# Patient Record
Sex: MALE | Race: WHITE | Employment: OTHER | URBAN - METROPOLITAN AREA
[De-identification: names, ages, dates, MRNs, and addresses within clinical notes are randomized per-mention and may not be internally consistent; named-entity substitution may affect disease eponyms.]

---

## 2017-05-02 RX ORDER — CLONAZEPAM 1 MG/1
TABLET ORAL
Qty: 30 TABLET | Refills: 5 | Status: SHIPPED
Start: 2017-05-02 | End: 2017-11-20

## 2017-05-02 RX ORDER — ALBUTEROL SULFATE 90 UG/1
1 AEROSOL, METERED RESPIRATORY (INHALATION) AS DIRECTED
COMMUNITY
Start: 2015-11-07 | End: 2017-11-17

## 2017-05-02 RX ORDER — CLONAZEPAM 1 MG/1
1 TABLET ORAL
Refills: 5 | COMMUNITY
Start: 2017-04-10 | End: 2017-05-02

## 2017-05-02 RX ORDER — AMLODIPINE BESYLATE 5 MG/1
1 TABLET ORAL DAILY
Refills: 3 | COMMUNITY
Start: 2017-04-10 | End: 2017-07-26

## 2017-05-02 RX ORDER — OMEPRAZOLE 20 MG/1
1 CAPSULE, DELAYED RELEASE ORAL DAILY
Refills: 3 | COMMUNITY
Start: 2017-04-18 | End: 2017-09-27

## 2017-05-02 RX ORDER — ATORVASTATIN CALCIUM 10 MG/1
1 TABLET, FILM COATED ORAL
Refills: 3 | COMMUNITY
Start: 2017-04-10 | End: 2017-07-26 | Stop reason: DRUGHIGH

## 2017-05-02 RX ORDER — TAMSULOSIN HYDROCHLORIDE 0.4 MG/1
1 CAPSULE ORAL DAILY
COMMUNITY
Start: 2016-08-25 | End: 2017-05-16

## 2017-05-02 RX ORDER — RAMIPRIL 10 MG/1
1 CAPSULE ORAL DAILY
Refills: 3 | COMMUNITY
Start: 2017-04-26 | End: 2017-07-26

## 2017-05-10 ENCOUNTER — TELEPHONE (OUTPATIENT)
Dept: FAMILY MEDICINE CLINIC | Facility: CLINIC | Age: 82
End: 2017-05-10

## 2017-05-10 DIAGNOSIS — I10 ESSENTIAL HYPERTENSION: Primary | ICD-10-CM

## 2017-05-10 NOTE — TELEPHONE ENCOUNTER
----- Message from Mariola Rosas sent at 5/10/2017  8:22 AM CDT -----  Regarding: lab orders needed   Patient has lab appointment on 5/12/17  could you please put lab orders in system.         Thanks,  Kavita

## 2017-05-12 ENCOUNTER — LAB ENCOUNTER (OUTPATIENT)
Dept: LAB | Age: 82
End: 2017-05-12
Attending: FAMILY MEDICINE
Payer: MEDICARE

## 2017-05-12 DIAGNOSIS — I10 ESSENTIAL HYPERTENSION: ICD-10-CM

## 2017-05-12 PROCEDURE — 85025 COMPLETE CBC W/AUTO DIFF WBC: CPT

## 2017-05-12 PROCEDURE — 80053 COMPREHEN METABOLIC PANEL: CPT

## 2017-05-16 ENCOUNTER — OFFICE VISIT (OUTPATIENT)
Dept: FAMILY MEDICINE CLINIC | Facility: CLINIC | Age: 82
End: 2017-05-16

## 2017-05-16 VITALS
HEIGHT: 71 IN | RESPIRATION RATE: 16 BRPM | HEART RATE: 80 BPM | BODY MASS INDEX: 23.96 KG/M2 | DIASTOLIC BLOOD PRESSURE: 80 MMHG | SYSTOLIC BLOOD PRESSURE: 176 MMHG | TEMPERATURE: 98 F | WEIGHT: 171.13 LBS

## 2017-05-16 DIAGNOSIS — L30.8 OTHER ECZEMA: ICD-10-CM

## 2017-05-16 DIAGNOSIS — C44.91 BASAL CELL CARCINOMA: ICD-10-CM

## 2017-05-16 DIAGNOSIS — I10 BENIGN ESSENTIAL HYPERTENSION: ICD-10-CM

## 2017-05-16 DIAGNOSIS — I65.23 BILATERAL CAROTID ARTERY STENOSIS: Primary | ICD-10-CM

## 2017-05-16 DIAGNOSIS — J44.9 CHRONIC OBSTRUCTIVE PULMONARY DISEASE, UNSPECIFIED COPD TYPE (HCC): ICD-10-CM

## 2017-05-16 PROBLEM — I65.29 STENOSIS OF CAROTID ARTERY: Status: ACTIVE | Noted: 2017-05-16

## 2017-05-16 PROBLEM — D86.9 SARCOIDOSIS: Status: ACTIVE | Noted: 2017-05-16

## 2017-05-16 PROBLEM — I70.92 CHRONIC TOTAL OCCLUSION OF ARTERY OF EXTREMITY (HCC): Status: ACTIVE | Noted: 2017-05-16

## 2017-05-16 PROCEDURE — 99214 OFFICE O/P EST MOD 30 MIN: CPT | Performed by: FAMILY MEDICINE

## 2017-05-16 PROCEDURE — 93000 ELECTROCARDIOGRAM COMPLETE: CPT | Performed by: FAMILY MEDICINE

## 2017-05-16 RX ORDER — ASPIRIN 81 MG/1
1 TABLET ORAL DAILY
COMMUNITY
End: 2018-05-29 | Stop reason: DRUGHIGH

## 2017-05-16 NOTE — PATIENT INSTRUCTIONS
If the feeling of dread comes back, try going outside. Use a moisturizing salve on the rash on the back. Schedule carotid doppler testing. See Dr Nisha Madrid for ear.

## 2017-05-16 NOTE — PROGRESS NOTES
[facility] [place]  PROGRESS NOTE  Chief Complaint:   Patient presents with:  Medication Follow-Up  Lump: Back      HPI:   This is a 80year old male coming in for medication follow-up. He has several concerns today.     First of all he said rich 1. 30-6.70 x10 (3) uL   Neutrophil Absolute 3.04 1.30-6.70 x10(3) uL   Lymphocyte Absolute 1.25 0.90-4.00 x10(3) uL   Monocyte Absolute 0.53 0.10-0.60 x10(3) uL   Eosinophil Absolute 0.30 0.00-0.30 x10(3) uL   Basophil Absolute 0.02 0.00-0.10 x10(3) uL   Im Comment:Other reaction(s): swollen tongue  Trimethoprim            Hives  Current Meds:    Current Outpatient Prescriptions:  aspirin  MG Oral Tab EC Take 1 tablet by mouth daily.  Disp:  Rfl:    Pumpkin Seed-Soy Germ (AZO BLADDER CONTROL/GO-LESS OR) bruising. LYMPHATICS:  Denies enlarged nodes or history of splenectomy. PSYCHIATRIC: He denies feeling depressed or anxious but did have a feeling of impending doom several days ago.   ENDOCRINOLOGIC:  Denies excessive sweating, cold or heat intolerance, carotid artery stenosis  He does have a history of bilateral carotid artery stenosis. He is due for a screening carotid Doppler. We will order that for him now. - US CAROTID DOPPLER BILAT - DIAG IM (CPT=93880); Future    2.  Benign essential hypertensio allergies, or worsening or changing symptoms. Patient is to call with any side effects or complications from the treatments as a result of today.      Problem List:  Patient Active Problem List:     Malignant neoplasm of colon ([facility] [address]     Stenosis of carotid

## 2017-06-07 ENCOUNTER — TELEPHONE (OUTPATIENT)
Dept: FAMILY MEDICINE CLINIC | Facility: CLINIC | Age: 82
End: 2017-06-07

## 2017-06-08 NOTE — TELEPHONE ENCOUNTER
I called Gene. His carotid ultrasound shows complete blockage on the left side and good blood flow on the right side. It is unchanged from June 2015. No new treatment needed now.

## 2017-06-14 ENCOUNTER — PATIENT OUTREACH (OUTPATIENT)
Dept: FAMILY MEDICINE CLINIC | Facility: CLINIC | Age: 82
End: 2017-06-14

## 2017-06-14 ENCOUNTER — TELEPHONE (OUTPATIENT)
Dept: FAMILY MEDICINE CLINIC | Facility: CLINIC | Age: 82
End: 2017-06-14

## 2017-06-14 NOTE — TELEPHONE ENCOUNTER
Patient informed His visit in November has been changed to a Medicare Physical.  Informed He should not receive anymore phone calls regarding Appt needed/agreed.   Leeanna Tejdaa, 06/14/2017, 5:05 PM

## 2017-07-08 ENCOUNTER — TELEPHONE (OUTPATIENT)
Dept: FAMILY MEDICINE CLINIC | Facility: CLINIC | Age: 82
End: 2017-07-08

## 2017-07-08 NOTE — TELEPHONE ENCOUNTER
Called to find out how pt is doing, if pt did have a TIA this morning. This office visit was not triaged just scheduled. Left message for a return call to see how pt is doing. Per Dr. Clotilde Lyles if pt did have a TIA this morning pt should go to ER.

## 2017-07-08 NOTE — TELEPHONE ENCOUNTER
Pt daughter returned my call. According to [facility], p'st leg are weaker today, right is worse. Also pt memory loss is worse today, pt can't remember yesterday at all. No facial drooping or slurring of words.  Due to the memory loss and leg weakness, pt was sen

## 2017-07-14 ENCOUNTER — MED REC SCAN ONLY (OUTPATIENT)
Dept: FAMILY MEDICINE CLINIC | Facility: CLINIC | Age: 82
End: 2017-07-14

## 2017-07-26 ENCOUNTER — OFFICE VISIT (OUTPATIENT)
Dept: FAMILY MEDICINE CLINIC | Facility: CLINIC | Age: 82
End: 2017-07-26

## 2017-07-26 VITALS
HEART RATE: 72 BPM | BODY MASS INDEX: 25.18 KG/M2 | WEIGHT: 170 LBS | SYSTOLIC BLOOD PRESSURE: 176 MMHG | DIASTOLIC BLOOD PRESSURE: 86 MMHG | TEMPERATURE: 98 F | HEIGHT: 69 IN | RESPIRATION RATE: 16 BRPM

## 2017-07-26 DIAGNOSIS — J44.9 CHRONIC OBSTRUCTIVE PULMONARY DISEASE, UNSPECIFIED COPD TYPE (HCC): ICD-10-CM

## 2017-07-26 DIAGNOSIS — G45.4 TRANSIENT GLOBAL AMNESIA: ICD-10-CM

## 2017-07-26 DIAGNOSIS — I65.22 LEFT CAROTID ARTERY OCCLUSION: ICD-10-CM

## 2017-07-26 DIAGNOSIS — G45.1 HEMISPHERIC CAROTID ARTERY SYNDROME: Primary | ICD-10-CM

## 2017-07-26 DIAGNOSIS — I10 BENIGN ESSENTIAL HYPERTENSION: ICD-10-CM

## 2017-07-26 DIAGNOSIS — I65.22 STENOSIS OF LEFT CAROTID ARTERY: ICD-10-CM

## 2017-07-26 PROBLEM — R41.3 AMNESIA: Status: ACTIVE | Noted: 2017-07-08

## 2017-07-26 PROCEDURE — 99214 OFFICE O/P EST MOD 30 MIN: CPT | Performed by: FAMILY MEDICINE

## 2017-07-26 RX ORDER — AMLODIPINE BESYLATE 5 MG/1
5 TABLET ORAL DAILY
Qty: 90 TABLET | Refills: 3 | Status: SHIPPED | OUTPATIENT
Start: 2017-07-26 | End: 2017-11-21

## 2017-07-26 RX ORDER — RAMIPRIL 10 MG/1
10 CAPSULE ORAL DAILY
Qty: 90 CAPSULE | Refills: 3 | Status: SHIPPED | OUTPATIENT
Start: 2017-07-26 | End: 2017-12-20

## 2017-07-26 RX ORDER — ATORVASTATIN CALCIUM 20 MG/1
20 TABLET, FILM COATED ORAL NIGHTLY
COMMUNITY
End: 2017-11-17

## 2017-07-26 NOTE — PROGRESS NOTES
[facility] [place]  PROGRESS NOTE  Chief Complaint:   Patient presents with:  Hospital F/U: [address] Discharge  Medication Request: Med Refills/[facility]      HPI:   This is a 80year old male coming in for hospital follow-up.     He reports that he Eosinophil Absolute 0.30 0.00 - 0.30 x10(3) uL   Basophil Absolute 0.02 0.00 - 0.10 x10(3) uL   Immature Granulocyte Absolute 0.01 0.00 - 1.00 x10(3) uL   Neutrophil % 59.0 %   Lymphocyte % 24.3 %   Monocyte % 10.3 %   Eosinophil % 5.8 %   Basophil % 0.4 20 MG Oral Tab Take 20 mg by mouth nightly. Disp:  Rfl:    AmLODIPine Besylate 5 MG Oral Tab Take 1 tablet (5 mg total) by mouth daily. Disp: 90 tablet Rfl: 3   ramipril 10 MG Oral Cap Take 1 capsule (10 mg total) by mouth daily.  Disp: 90 capsule Rfl: 3 anxiety. ENDOCRINOLOGIC:  Denies excessive sweating, cold or heat intolerance, polyuria or polydipsia. ALLERGIES:  Denies allergic response, history of asthma, sneezing, hives, eczema or rhinitis.      EXAM:   BP (!) 176/86 (BP Location: Right arm, Patien long-standing hypertension. At this point his elevated blood pressure today is primarily due to anxiety. 4. Chronic obstructive pulmonary disease, unspecified COPD type ([address]  His COPD is been well controlled.     5. Left carotid artery occlusion  His l

## 2017-08-10 ENCOUNTER — TELEPHONE (OUTPATIENT)
Dept: FAMILY MEDICINE CLINIC | Facility: CLINIC | Age: 82
End: 2017-08-10

## 2017-08-10 NOTE — TELEPHONE ENCOUNTER
Emmy Gan from [facility] Dr Wilber Young's office called and stated patient will be seeing Dr Aaron Paris. They requested most recent office notes and Carotid U/S from 6/7/17.    I faxed them the U/S report from 6/7/17 & the office notes from 7/26/

## 2017-09-27 RX ORDER — OMEPRAZOLE 20 MG/1
CAPSULE, DELAYED RELEASE ORAL
Qty: 90 CAPSULE | Refills: 3 | Status: SHIPPED | OUTPATIENT
Start: 2017-09-27 | End: 2018-02-27

## 2017-09-27 NOTE — TELEPHONE ENCOUNTER
Future appt:     Your appointments     Date & Time Appointment Department [facility])    Nov 17, 2017  8:00 AM CST Medicare Annual Well Visit with Shekhar Araya MD [address] [place] [address]        Beverley Baires

## 2017-10-12 ENCOUNTER — OFFICE VISIT (OUTPATIENT)
Dept: FAMILY MEDICINE CLINIC | Facility: CLINIC | Age: 82
End: 2017-10-12

## 2017-10-12 ENCOUNTER — APPOINTMENT (OUTPATIENT)
Dept: LAB | Age: 82
End: 2017-10-12
Attending: FAMILY MEDICINE
Payer: MEDICARE

## 2017-10-12 ENCOUNTER — TELEPHONE (OUTPATIENT)
Dept: FAMILY MEDICINE CLINIC | Facility: CLINIC | Age: 82
End: 2017-10-12

## 2017-10-12 VITALS
HEART RATE: 71 BPM | BODY MASS INDEX: 25 KG/M2 | OXYGEN SATURATION: 95 % | SYSTOLIC BLOOD PRESSURE: 150 MMHG | WEIGHT: 171.81 LBS | DIASTOLIC BLOOD PRESSURE: 78 MMHG | TEMPERATURE: 98 F

## 2017-10-12 DIAGNOSIS — I95.9 HYPOTENSION, UNSPECIFIED HYPOTENSION TYPE: Primary | ICD-10-CM

## 2017-10-12 DIAGNOSIS — I10 BENIGN ESSENTIAL HYPERTENSION: ICD-10-CM

## 2017-10-12 DIAGNOSIS — I65.22 STENOSIS OF LEFT CAROTID ARTERY: ICD-10-CM

## 2017-10-12 PROCEDURE — 80053 COMPREHEN METABOLIC PANEL: CPT | Performed by: FAMILY MEDICINE

## 2017-10-12 PROCEDURE — 85025 COMPLETE CBC W/AUTO DIFF WBC: CPT | Performed by: FAMILY MEDICINE

## 2017-10-12 PROCEDURE — 99214 OFFICE O/P EST MOD 30 MIN: CPT | Performed by: FAMILY MEDICINE

## 2017-10-12 PROCEDURE — 36415 COLL VENOUS BLD VENIPUNCTURE: CPT | Performed by: FAMILY MEDICINE

## 2017-10-12 NOTE — PATIENT INSTRUCTIONS
Await labs  Use pill box for meds  Increase fluid in diet  Do not shower till after earting and drinking, wait 30 minutes  Check BP at home  Invest in Home Alert  Alvarez  Have shower chair available

## 2017-10-12 NOTE — TELEPHONE ENCOUNTER
Patient's daughter Lukas Rae states late last night patient's Bp was very elevated; 226/106. States early this morning after patient's shower his Bp was 80/50. States his Bp always drops after a shower.   Daughter states patient is feeling shaky and unsteady o

## 2017-10-12 NOTE — TELEPHONE ENCOUNTER
Per Dr. Cuba Ramos, with patient's Bp being all over the place, it is not in the best interest for patient to wait for an appt this afternoon. States patient really needs to have labs where the labs can be resulted immediately.   Also stated if patient still

## 2017-10-13 ENCOUNTER — TELEPHONE (OUTPATIENT)
Dept: FAMILY MEDICINE CLINIC | Facility: CLINIC | Age: 82
End: 2017-10-13

## 2017-10-13 NOTE — PROGRESS NOTES
[facility] [place]  PROGRESS NOTE  Chief Complaint:   Patient presents with:  Blood Pressure      HPI:   This is a 80year old male coming in for evaluation for widely fluctuating blood pressures.   Patient states last night he checked his blood uL   RBC 4.69 3.80 - 5.80 x10(6)uL   HGB 14.9 13.0 - 17.0 g/dL   HCT 45.2 37.0 - 53.0 %   .0 150.0 - 450.0 10(3)uL   MCV 96.4 80.0 - 99.0 fL   MCH 31.8 27.0 - 33.2 pg   MCHC 33.0 31.0 - 37.0 g/dL   RDW 13.2 11.5 - 16.0 %   RDW-SD 46.7 (H) 35.1 - 46. reaction(s): stomach cramping and diarrhea  Levofloxacin                Comment:Other reaction(s): bothered his legs  Sulfamethoxazole W/*        Comment:Other reaction(s): Unknown             Other reaction(s): hives  Opioid Analgesics           Comment:O sputum. GASTROINTESTINAL:  Denies abdominal pain, nausea, vomiting, constipation, diarrhea, or blood in stool. MUSCULOSKELETAL:  Denies weakness, muscle aches, back pain, joint pain, swelling or stiffness.   NEUROLOGICAL:  Denies headache, seizures, dizzi bilaterally. NEURO:  No deficit, normal gait, strength and tone,      ASSESSMENT AND PLAN:   Gene was seen today for blood pressure.     Diagnoses and all orders for this visit:    Hypotension, unspecified hypotension type  -     CBC WITH DIFFERENTIAL WITH complications, allergies, or worsening or changing symptoms. Patient is to call with any side effects or complications from the treatments as a result of today.      Problem List:  Patient Active Problem List:     Malignant neoplasm of colon ([address]     Sten

## 2017-10-13 NOTE — TELEPHONE ENCOUNTER
----- Message from Kevin Carmona MD sent at 10/12/2017  8:55 PM CDT -----  Labs stable  No changes in meds

## 2017-11-17 ENCOUNTER — OFFICE VISIT (OUTPATIENT)
Dept: FAMILY MEDICINE CLINIC | Facility: CLINIC | Age: 82
End: 2017-11-17

## 2017-11-17 ENCOUNTER — APPOINTMENT (OUTPATIENT)
Dept: LAB | Age: 82
End: 2017-11-17
Attending: FAMILY MEDICINE
Payer: MEDICARE

## 2017-11-17 VITALS
TEMPERATURE: 97 F | BODY MASS INDEX: 25.1 KG/M2 | HEART RATE: 74 BPM | WEIGHT: 169.5 LBS | HEIGHT: 69 IN | SYSTOLIC BLOOD PRESSURE: 124 MMHG | RESPIRATION RATE: 16 BRPM | DIASTOLIC BLOOD PRESSURE: 64 MMHG

## 2017-11-17 DIAGNOSIS — E83.52 HYPERCALCEMIA: ICD-10-CM

## 2017-11-17 DIAGNOSIS — R23.2 FACIAL FLUSHING: ICD-10-CM

## 2017-11-17 DIAGNOSIS — G45.1 HEMISPHERIC CAROTID ARTERY SYNDROME: ICD-10-CM

## 2017-11-17 DIAGNOSIS — I70.92 CHRONIC TOTAL OCCLUSION OF ARTERY OF EXTREMITY (HCC): ICD-10-CM

## 2017-11-17 DIAGNOSIS — I10 BENIGN ESSENTIAL HYPERTENSION: ICD-10-CM

## 2017-11-17 DIAGNOSIS — D86.9 SARCOIDOSIS: ICD-10-CM

## 2017-11-17 DIAGNOSIS — Z00.00 ENCOUNTER FOR MEDICARE ANNUAL WELLNESS EXAM: Primary | ICD-10-CM

## 2017-11-17 DIAGNOSIS — J44.9 CHRONIC OBSTRUCTIVE PULMONARY DISEASE, UNSPECIFIED COPD TYPE (HCC): ICD-10-CM

## 2017-11-17 PROCEDURE — 84550 ASSAY OF BLOOD/URIC ACID: CPT | Performed by: FAMILY MEDICINE

## 2017-11-17 PROCEDURE — 80053 COMPREHEN METABOLIC PANEL: CPT | Performed by: FAMILY MEDICINE

## 2017-11-17 PROCEDURE — 84443 ASSAY THYROID STIM HORMONE: CPT | Performed by: FAMILY MEDICINE

## 2017-11-17 PROCEDURE — G0009 ADMIN PNEUMOCOCCAL VACCINE: HCPCS | Performed by: FAMILY MEDICINE

## 2017-11-17 PROCEDURE — 84439 ASSAY OF FREE THYROXINE: CPT | Performed by: FAMILY MEDICINE

## 2017-11-17 PROCEDURE — 80061 LIPID PANEL: CPT | Performed by: FAMILY MEDICINE

## 2017-11-17 PROCEDURE — 90732 PPSV23 VACC 2 YRS+ SUBQ/IM: CPT | Performed by: FAMILY MEDICINE

## 2017-11-17 PROCEDURE — 99214 OFFICE O/P EST MOD 30 MIN: CPT | Performed by: FAMILY MEDICINE

## 2017-11-17 PROCEDURE — G0439 PPPS, SUBSEQ VISIT: HCPCS | Performed by: FAMILY MEDICINE

## 2017-11-17 PROCEDURE — 36415 COLL VENOUS BLD VENIPUNCTURE: CPT | Performed by: FAMILY MEDICINE

## 2017-11-17 NOTE — PROGRESS NOTES
[facility] [place]  PROGRESS NOTE  Chief Complaint:   Patient presents with:  Physical      HPI:   This is a 80year old male coming in for his annual Medicare wellness exam.    He has several concerns he would like to address today.   First, his 7.7 6.1 - 8.3 g/dL   Albumin 4.1 3.5 - 4.8 g/dL   Sodium 133 (L) 136 - 144 mmol/L   Potassium 5.0 3.6 - 5.1 mmol/L   Chloride 98 (L) 101 - 111 mmol/L   CO2 28.0 22.0 - 32.0 mmol/L   -CBC W/ DIFFERENTIAL   Result Value Ref Range   WBC 7.0 4.0 - 13.0 x10(3) 1/1/1997  Smokeless tobacco: Never Used                      Alcohol use: No              Family History:  No family history on file.   Allergies:    Codeine                     Comment:Other reaction(s): slowed heart rate  Hydrochlorothiazide*        Comme nausea, vomiting, constipation, diarrhea, or blood in stool. MUSCULOSKELETAL:  Denies weakness, muscle aches, back pain, joint pain, swelling or stiffness.   NEUROLOGICAL:  Denies headache, seizures, dizziness, syncope, paralysis, ataxia, numbness or tingl an uncircumcised male. Foreskin retracts easily. Testes are descended bilaterally. No inguinal hernia noted. Rectal: Anal verge is normal.  Prostate is mildly enlarged with no nodules or tenderness. Stool is brown and Hemoccult negative.   BACK: No ten Hypercalcemia  He has had hypercalcemia in the past.  We will recheck that now.  - TSH+FREE T4; Future  - TSH+FREE T4    8. Facial flushing  He has a history of facial flushing. We will look at his labs for an answer there.       Meds & Refills for this Vi

## 2017-11-17 NOTE — PATIENT INSTRUCTIONS
Check labs today. Prevnar vaccine. Recommended Websites for Advanced Directives    [employer]. org/publications/Documents/personal_dec. pdf  An information packet, including necessary form from the [employer] website.      http:

## 2017-11-18 ENCOUNTER — TELEPHONE (OUTPATIENT)
Dept: FAMILY MEDICINE CLINIC | Facility: CLINIC | Age: 82
End: 2017-11-18

## 2017-11-18 NOTE — TELEPHONE ENCOUNTER
----- Message from Olivia Dukes MD sent at 11/18/2017 11:08 AM CST -----  Please call Gene. His labs look very good. His kidney function is good. His cholesterol is normal at 197.   His thyroid is normal.  His uric acid is normal.  His blood count i

## 2017-11-20 ENCOUNTER — TELEPHONE (OUTPATIENT)
Dept: FAMILY MEDICINE CLINIC | Facility: CLINIC | Age: 82
End: 2017-11-20

## 2017-11-20 NOTE — TELEPHONE ENCOUNTER
Patient requesting change from Amlodipine to another Rx. States there are too many side effects and He is experiencing some of them. Please advise change in Rx.   Pearl Gutierrez, 11/20/17, 9:45 AM

## 2017-11-20 NOTE — TELEPHONE ENCOUNTER
Future appt:     Your appointments     Date & Time Appointment Department [facility])    May 15, 2018  8:00 AM CDT Follow up with Sharita Vuong MD [address] [place] [address]        OLIVIA Stroud, [facility], [address]

## 2017-11-21 ENCOUNTER — TELEPHONE (OUTPATIENT)
Dept: FAMILY MEDICINE CLINIC | Facility: CLINIC | Age: 82
End: 2017-11-21

## 2017-11-21 RX ORDER — FELODIPINE 5 MG/1
5 TABLET, EXTENDED RELEASE ORAL DAILY
Qty: 30 TABLET | Refills: 5 | Status: SHIPPED | OUTPATIENT
Start: 2017-11-21 | End: 2017-12-12

## 2017-11-21 RX ORDER — PIROXICAM 10 MG/1
10 CAPSULE ORAL DAILY
Qty: 30 CAPSULE | Refills: 5 | Status: SHIPPED | OUTPATIENT
Start: 2017-11-21 | End: 2017-11-21

## 2017-11-21 RX ORDER — CLONAZEPAM 1 MG/1
TABLET ORAL
Qty: 30 TABLET | Refills: 5 | Status: SHIPPED
Start: 2017-11-21 | End: 2017-12-29

## 2017-11-21 NOTE — TELEPHONE ENCOUNTER
Patient informed of below. Expressed understanding. Appt given Wed 12/20 8:30am with Dr Dhruv Smith for HTN Follow Up.   Chantelle Peraza, 11/21/17, 3:52 PM

## 2017-11-21 NOTE — TELEPHONE ENCOUNTER
Stop taking Amlodipine. Start taking Piroxicam 10 mg daily - different type of blood pressure medication. Schedule an appt in 1 month to make sure the medication is working.

## 2017-11-21 NOTE — TELEPHONE ENCOUNTER
Pharmacist states Piroxicam is an anti inflamatory. States is not covered Rx. Please advise.   Margie Myles, 11/21/17, 4:42 PM

## 2017-11-21 NOTE — TELEPHONE ENCOUNTER
That was my error and not the medication I meant to send. I will correct the error and instead send Felodipine 5 mg daily.

## 2017-11-22 ENCOUNTER — TELEPHONE (OUTPATIENT)
Dept: FAMILY MEDICINE CLINIC | Facility: CLINIC | Age: 82
End: 2017-11-22

## 2017-11-22 NOTE — TELEPHONE ENCOUNTER
Need to know new BP medication that pt is supposed to be on.  Pt has been admitted into hospital on 11/22/17

## 2017-11-22 NOTE — TELEPHONE ENCOUNTER
Per Theone Sherwin-  Patient blacked out this am, fell gashed head. Being admitted 23hr observation. Calling to see what HTN Medication was changed to. Informed Felodipine ER 5mg. She will let [facility]  Staff know.   Luis Chan, 11/22/17, 3:42 PM

## 2017-11-29 ENCOUNTER — TELEPHONE (OUTPATIENT)
Dept: FAMILY MEDICINE CLINIC | Facility: CLINIC | Age: 82
End: 2017-11-29

## 2017-11-29 NOTE — TELEPHONE ENCOUNTER
We recieved a medical records request from [facility] requesting recent office notes, demographics, EKG, heart monitor results, and any cardiac related testing.   I printed office notes from 11/17/17 and 10/12/17, and EKG from 5/16/17, Krystal Mireles

## 2017-12-01 ENCOUNTER — OFFICE VISIT (OUTPATIENT)
Dept: FAMILY MEDICINE CLINIC | Facility: CLINIC | Age: 82
End: 2017-12-01

## 2017-12-01 VITALS
WEIGHT: 172.38 LBS | DIASTOLIC BLOOD PRESSURE: 80 MMHG | HEART RATE: 60 BPM | HEIGHT: 69 IN | BODY MASS INDEX: 25.53 KG/M2 | SYSTOLIC BLOOD PRESSURE: 170 MMHG | TEMPERATURE: 97 F | RESPIRATION RATE: 16 BRPM

## 2017-12-01 DIAGNOSIS — R55 VASOVAGAL SYNCOPE: ICD-10-CM

## 2017-12-01 DIAGNOSIS — I10 BENIGN ESSENTIAL HYPERTENSION: ICD-10-CM

## 2017-12-01 DIAGNOSIS — J18.9 COMMUNITY ACQUIRED PNEUMONIA, UNSPECIFIED LATERALITY: Primary | ICD-10-CM

## 2017-12-01 DIAGNOSIS — J44.9 CHRONIC OBSTRUCTIVE PULMONARY DISEASE, UNSPECIFIED COPD TYPE (HCC): ICD-10-CM

## 2017-12-01 PROCEDURE — 99214 OFFICE O/P EST MOD 30 MIN: CPT | Performed by: FAMILY MEDICINE

## 2017-12-01 RX ORDER — LEVOFLOXACIN 750 MG/1
1 TABLET ORAL EVERY OTHER DAY
Refills: 0 | COMMUNITY
Start: 2017-11-26 | End: 2017-12-13 | Stop reason: ALTCHOICE

## 2017-12-01 NOTE — PATIENT INSTRUCTIONS
Finish the course of Levaquin. Continue to use the oxygen 24 7. Recheck in 2 weeks. We will do a chest x-ray then.

## 2017-12-01 NOTE — PROGRESS NOTES
[facility] [place]  PROGRESS NOTE  Chief Complaint:   Patient presents with:  Hospital F/U      HPI:   This is a 80year old male coming in for hospital follow-up. He was seen here in the office on November 17.   At that time no significant ch Potassium 4.1 3.6 - 5.1 mmol/L   Chloride 97 (L) 101 - 111 mmol/L   CO2 28.0 22.0 - 32.0 mmol/L   -LIPID PANEL   Result Value Ref Range   Cholesterol, Total 197 <200 mg/dL   Triglycerides 190 (H) <150 mg/dL   HDL Cholesterol 52 >45 mg/dL   LDL Cholestero Analgesics           Comment:Other reaction(s): Swelling  Penicillin G                Comment:Other reaction(s): swollen tongue  Trimethoprim            Hives  Amlodipine              Face Flushing  Current Meds:    Current Outpatient Prescriptions:  levof in bowel or bladder control. HEMATOLOGIC:  Denies anemia, bleeding or bruising. LYMPHATICS:  Denies enlarged nodes or history of splenectomy. PSYCHIATRIC:  Denies depression or anxiety.   ENDOCRINOLOGIC:  Denies excessive sweating, cold or heat intoleran laterality  He was admitted to [facility] with community-acquired pneumonia and an episode of syncope. He is gradually improving now. Plan: [address] which she is taking every other day. He has 3 more doses left.   Continue to use the

## 2017-12-05 ENCOUNTER — TELEPHONE (OUTPATIENT)
Dept: FAMILY MEDICINE CLINIC | Facility: CLINIC | Age: 82
End: 2017-12-05

## 2017-12-05 NOTE — TELEPHONE ENCOUNTER
Per Rob Walker-  Patient went to [facility] ER last night with stomache pain. States pain started after given Levaquin from the ER. Today Pain 9/10. At [facility] and spreads outward.     Pain did get worse after breakfast.  States Patient did have BM Today that was

## 2017-12-05 NOTE — TELEPHONE ENCOUNTER
Geovanna informed per Dr Dhruv Smith-  Most likely black stool is part of reaction to Levaquin. But since the stool is black/patient is nauseated and abd pain. ER Advised-Agreed. She will inform Patient.   Chantelle Peraza, 12/05/17, 9:40 AM

## 2017-12-06 ENCOUNTER — MED REC SCAN ONLY (OUTPATIENT)
Dept: FAMILY MEDICINE CLINIC | Facility: CLINIC | Age: 82
End: 2017-12-06

## 2017-12-12 ENCOUNTER — TELEPHONE (OUTPATIENT)
Dept: FAMILY MEDICINE CLINIC | Facility: CLINIC | Age: 82
End: 2017-12-12

## 2017-12-12 RX ORDER — FELODIPINE 2.5 MG/1
2.5 TABLET, EXTENDED RELEASE ORAL DAILY
Qty: 30 TABLET | Refills: 5 | Status: SHIPPED | OUTPATIENT
Start: 2017-12-12 | End: 2017-12-12

## 2017-12-12 NOTE — TELEPHONE ENCOUNTER
Phoned Patient regarding below. States He is taking Felodipine 2.5mg. [facility] did not have the Felodipine 5mg tabs in stock  And was given 2.5mg tabs and informed to take 2.     Patient states He felt the Felodipine 5mg was to much and He reduced the Medica

## 2017-12-12 NOTE — TELEPHONE ENCOUNTER
Patient informed of below. Appt given 4:30 Wed 12/13 with Dr Rozina Michaels.   Neo Benton, 12/12/17, 4:28 PM

## 2017-12-12 NOTE — TELEPHONE ENCOUNTER
Several recommendations. First I recommend decreasing the felodipine to 2.5 mg daily. Secondly I recommend taking it in the evening before bed rather than at supper time. I have sent in a new prescription to the pharmacy for the 2.5 mg dose.

## 2017-12-12 NOTE — TELEPHONE ENCOUNTER
OK.  Please stop taking the Felodipine. Please make an appointment to discuss where we go from here.

## 2017-12-12 NOTE — TELEPHONE ENCOUNTER
Patient states He takes His Felodipine at 6pm.  This morning He woke up and blood pressure was 115/60. Held off on taking Ramipril unit blood pressure came up to 130's around 9am.    This afternoon He was at another [facility]icians [facility]-B/P 140's.     States

## 2017-12-13 ENCOUNTER — OFFICE VISIT (OUTPATIENT)
Dept: FAMILY MEDICINE CLINIC | Facility: CLINIC | Age: 82
End: 2017-12-13

## 2017-12-13 VITALS
RESPIRATION RATE: 18 BRPM | WEIGHT: 169.5 LBS | HEART RATE: 78 BPM | SYSTOLIC BLOOD PRESSURE: 162 MMHG | TEMPERATURE: 96 F | HEIGHT: 69 IN | DIASTOLIC BLOOD PRESSURE: 84 MMHG | BODY MASS INDEX: 25.1 KG/M2

## 2017-12-13 DIAGNOSIS — I10 BENIGN ESSENTIAL HYPERTENSION: Primary | ICD-10-CM

## 2017-12-13 DIAGNOSIS — F41.9 ANXIETY: ICD-10-CM

## 2017-12-13 PROBLEM — K59.00 CONSTIPATION: Status: ACTIVE | Noted: 2017-12-04

## 2017-12-13 PROBLEM — R10.13 EPIGASTRIC PAIN: Status: ACTIVE | Noted: 2017-12-05

## 2017-12-13 PROBLEM — K29.00 ACUTE GASTRITIS WITHOUT HEMORRHAGE: Status: ACTIVE | Noted: 2017-12-04

## 2017-12-13 PROBLEM — B37.81 CANDIDA ESOPHAGITIS (HCC): Status: ACTIVE | Noted: 2017-12-07

## 2017-12-13 PROBLEM — K92.2 GI BLEED: Status: ACTIVE | Noted: 2017-12-05

## 2017-12-13 PROCEDURE — 99214 OFFICE O/P EST MOD 30 MIN: CPT | Performed by: FAMILY MEDICINE

## 2017-12-13 RX ORDER — FELODIPINE 2.5 MG/1
2.5 TABLET, EXTENDED RELEASE ORAL EVERY EVENING
COMMUNITY
End: 2017-12-13

## 2017-12-13 RX ORDER — CARVEDILOL 3.12 MG/1
3.12 TABLET ORAL 2 TIMES DAILY
Qty: 60 TABLET | Refills: 5 | Status: SHIPPED | OUTPATIENT
Start: 2017-12-13 | End: 2017-12-20

## 2017-12-13 NOTE — PROGRESS NOTES
[facility] [place]  PROGRESS NOTE  Chief Complaint:   Patient presents with:  Medication Follow-Up      HPI:   This is a 80year old male coming in for follow-up on his blood pressure.   This new blood pressure medicine, felodipine, has been caus Medical History:   Diagnosis Date   • Anxiety    • Arthritis    • Cancer [facility])     colon   • COPD (chronic obstructive pulmonary disease) ([facility] [address]    • Eczema 4/9/2012   • Esophageal reflux    • Essential hypertension    • Hypercalcemia 10/7/2014   • Inocencia Rater Oral Cap Take 1 capsule (10 mg total) by mouth daily. Disp: 90 capsule Rfl: 3   aspirin  MG Oral Tab EC Take 1 tablet by mouth daily. Disp:  Rfl:       Counseling given: Not Answered       REVIEW OF SYSTEMS:   CONSTITUTIONAL: See HPI.   He says that h Appears stated age, well groomed. Physical Exam:  GEN: He is awake and alert. He is mildly anxious now.   HEENT:  Head:  Normocephalic, atraumatic Eyes: EOMI, PERRLA, no scleral icterus, conjunctivae clear bilaterally, no eye discharge Ears: External norm Eczema     Esophageal reflux     Hypercalcemia     Benign essential hypertension     Restless leg syndrome     Primary thrombocytopenia (HCC)     Sarcoidosis     Basal cell carcinoma     Transient global amnesia     Hemispheric carotid artery syndrome

## 2017-12-20 ENCOUNTER — HOSPITAL ENCOUNTER (OUTPATIENT)
Dept: GENERAL RADIOLOGY | Age: 82
Discharge: HOME | End: 2017-12-20
Attending: FAMILY MEDICINE
Payer: MEDICARE

## 2017-12-20 ENCOUNTER — OFFICE VISIT (OUTPATIENT)
Dept: FAMILY MEDICINE CLINIC | Facility: CLINIC | Age: 82
End: 2017-12-20

## 2017-12-20 VITALS
RESPIRATION RATE: 18 BRPM | HEART RATE: 64 BPM | WEIGHT: 166 LBS | TEMPERATURE: 97 F | BODY MASS INDEX: 24.59 KG/M2 | DIASTOLIC BLOOD PRESSURE: 60 MMHG | HEIGHT: 69 IN | SYSTOLIC BLOOD PRESSURE: 162 MMHG

## 2017-12-20 DIAGNOSIS — J44.9 CHRONIC OBSTRUCTIVE PULMONARY DISEASE, UNSPECIFIED COPD TYPE (HCC): ICD-10-CM

## 2017-12-20 DIAGNOSIS — I10 BENIGN ESSENTIAL HYPERTENSION: Primary | ICD-10-CM

## 2017-12-20 DIAGNOSIS — J18.9 COMMUNITY ACQUIRED PNEUMONIA, UNSPECIFIED LATERALITY: ICD-10-CM

## 2017-12-20 PROCEDURE — 99213 OFFICE O/P EST LOW 20 MIN: CPT | Performed by: FAMILY MEDICINE

## 2017-12-20 PROCEDURE — 71020 XR CHEST PA + LAT CHEST (CPT=71020): CPT | Performed by: FAMILY MEDICINE

## 2017-12-20 RX ORDER — RAMIPRIL 10 MG/1
10 CAPSULE ORAL DAILY
COMMUNITY
Start: 2017-12-20 | End: 2018-02-27

## 2017-12-20 NOTE — PROGRESS NOTES
[address]  PROGRESS NOTE  Chief Complaint:   Patient presents with: Follow - Up: follow up on HTN      HPI:   This is a 80year old male coming in for follow-up on his blood pressure.   He has not checked his blood pressure since he was mIU/mL       Past Medical History:   Diagnosis Date   • Anxiety    • Arthritis    • Cancer [facility])     colon   • COPD (chronic obstructive pulmonary disease) ([facility] [address]    • Eczema 4/9/2012   • Esophageal reflux    • Essential hypertension    • Hypercalcemia 10/7 EC Take 1 tablet by mouth daily. Disp:  Rfl:       Counseling given: Not Answered       REVIEW OF SYSTEMS:   CONSTITUTIONAL: He feels like the new blood pressure medicine gives him a hangover effect.   EENT:  Eyes:  Denies eye pain, visual loss, blurred vis PERRLA, no scleral icterus, conjunctivae clear bilaterally, no eye discharge Ears: External normal. Nose: patent, no nasal discharge Throat:  No tonsillar erythema or exudate. Mouth:  No oral lesions or ulcerations, good dentition.   NECK: Supple, no CLAD, ([address]     COPD (chronic obstructive pulmonary disease) (HCC)     Eczema     Esophageal reflux     Hypercalcemia     Benign essential hypertension     Restless leg syndrome     Primary thrombocytopenia ([address]     Sarcoidosis     Basal cell carcinoma     Rogerio Mederos

## 2017-12-26 ENCOUNTER — TELEPHONE (OUTPATIENT)
Dept: FAMILY MEDICINE CLINIC | Facility: CLINIC | Age: 82
End: 2017-12-26

## 2017-12-26 RX ORDER — CLONIDINE HYDROCHLORIDE 0.1 MG/1
0.1 TABLET ORAL DAILY
Qty: 30 TABLET | Refills: 5 | Status: SHIPPED | OUTPATIENT
Start: 2017-12-26 | End: 2017-12-29

## 2017-12-26 NOTE — TELEPHONE ENCOUNTER
Please decrease Ramipril back to 10 mg daily. Add Clonidine 0.1 mg daily. Call with progress report in next 3-4 days.

## 2017-12-26 NOTE — TELEPHONE ENCOUNTER
Patient states he took His Clonidine around 2:30. Question how long it should take for His B/P to start coming down. Numbers now are 197/106. Had a bad headache. Please advise.   Yessenia Hayes, 12/26/17, 4:20 PM

## 2017-12-26 NOTE — TELEPHONE ENCOUNTER
As above. B/P at home today 206/105. Feels weak and drug out. States cannot come to the office today. States taking 2 Ramipril daily. Threw out the Felodipine. Please advise.   Leeanna Tejada, 12/26/17, 11:26 AM

## 2017-12-26 NOTE — TELEPHONE ENCOUNTER
Patient informed of below. States B/P now in Left Arm is 150/80. Head is feeling better.   Margie Myles, 12/26/17, 4:40 PM

## 2017-12-26 NOTE — TELEPHONE ENCOUNTER
Clonidine works relatively quickly but we will not know how effective it will be until he has taken it for several days.

## 2017-12-29 ENCOUNTER — OFFICE VISIT (OUTPATIENT)
Dept: FAMILY MEDICINE CLINIC | Facility: CLINIC | Age: 82
End: 2017-12-29

## 2017-12-29 VITALS
DIASTOLIC BLOOD PRESSURE: 78 MMHG | HEIGHT: 69 IN | SYSTOLIC BLOOD PRESSURE: 170 MMHG | BODY MASS INDEX: 24.66 KG/M2 | HEART RATE: 74 BPM | RESPIRATION RATE: 14 BRPM | WEIGHT: 166.5 LBS | TEMPERATURE: 96 F

## 2017-12-29 DIAGNOSIS — F32.1 CURRENT MODERATE EPISODE OF MAJOR DEPRESSIVE DISORDER WITHOUT PRIOR EPISODE (HCC): ICD-10-CM

## 2017-12-29 DIAGNOSIS — I10 BENIGN ESSENTIAL HYPERTENSION: Primary | ICD-10-CM

## 2017-12-29 PROCEDURE — 99214 OFFICE O/P EST MOD 30 MIN: CPT | Performed by: FAMILY MEDICINE

## 2017-12-29 RX ORDER — CLONAZEPAM 1 MG/1
1 TABLET ORAL 3 TIMES DAILY
Qty: 90 TABLET | Refills: 1 | Status: SHIPPED | OUTPATIENT
Start: 2017-12-29 | End: 2018-01-12

## 2017-12-29 NOTE — PATIENT INSTRUCTIONS
Stop taking Clonidine. Start taking Sertraline 50 mg daily. Increase Clonazepam to 1 mg three times a day.

## 2017-12-29 NOTE — PROGRESS NOTES
[address]  PROGRESS NOTE  Chief Complaint:   Patient presents with: Follow - Up  Blood Pressure      HPI:   This is a 80year old male coming in for follow-up on his blood pressure. He said that this medicine was terrible.   It may f - 1.8 ng/dL   TSH 4.340 0.350 - 5.500 mIU/mL       Past Medical History:   Diagnosis Date   • Anxiety    • Arthritis    • Cancer [facility])     colon   • COPD (chronic obstructive pulmonary disease) ([facility]    • Eczema 4/9/2012   • Esophageal reflux    • [facility] Take 10 mg by mouth daily. Disp:  Rfl:    OMEPRAZOLE 20 MG Oral Capsule Delayed Release TAKE ONE CAPSULE BY MOUTH EVERY DAY Disp: 90 capsule Rfl: 3   aspirin  MG Oral Tab EC Take 1 tablet by mouth daily.  Disp:  Rfl:       Counseling given: Not Answ well groomed. Physical Exam:  GEN: He walked in on his own was able to climb on the exam table. He is somewhat anxious.   HEENT:  Head:  Normocephalic, atraumatic Eyes: EOMI, PERRLA, no scleral icterus, conjunctivae clear bilaterally, no eye discharge Ear are no barriers to learning. Medical education done. Outcome: Patient verbalizes understanding. Patient is notified to call with any questions, complications, allergies, or worsening or changing symptoms.   Patient is to call with any side effects or comp

## 2018-01-12 ENCOUNTER — OFFICE VISIT (OUTPATIENT)
Dept: FAMILY MEDICINE CLINIC | Facility: CLINIC | Age: 83
End: 2018-01-12

## 2018-01-12 ENCOUNTER — TELEPHONE (OUTPATIENT)
Dept: FAMILY MEDICINE CLINIC | Facility: CLINIC | Age: 83
End: 2018-01-12

## 2018-01-12 VITALS
DIASTOLIC BLOOD PRESSURE: 82 MMHG | BODY MASS INDEX: 25.03 KG/M2 | HEART RATE: 84 BPM | HEIGHT: 69 IN | WEIGHT: 169 LBS | SYSTOLIC BLOOD PRESSURE: 180 MMHG | TEMPERATURE: 96 F | RESPIRATION RATE: 20 BRPM

## 2018-01-12 DIAGNOSIS — F32.1 CURRENT MODERATE EPISODE OF MAJOR DEPRESSIVE DISORDER WITHOUT PRIOR EPISODE (HCC): ICD-10-CM

## 2018-01-12 DIAGNOSIS — I10 BENIGN ESSENTIAL HYPERTENSION: Primary | ICD-10-CM

## 2018-01-12 PROBLEM — E87.1 HYPONATREMIA: Status: ACTIVE | Noted: 2018-01-12

## 2018-01-12 PROCEDURE — 99213 OFFICE O/P EST LOW 20 MIN: CPT | Performed by: FAMILY MEDICINE

## 2018-01-12 RX ORDER — CLONAZEPAM 1 MG/1
1 TABLET ORAL 3 TIMES DAILY
Qty: 90 TABLET | Refills: 1 | Status: SHIPPED
Start: 2018-01-12 | End: 2018-01-22

## 2018-01-12 NOTE — PATIENT INSTRUCTIONS
Use over the counter 1% hyrdocortisone cream on the inside of the Right ear twice a day for the next several days. Continue the same medications.

## 2018-01-12 NOTE — PROGRESS NOTES
[place] [facility] [place]  PROGRESS NOTE  Chief Complaint:   Patient presents with:  Hypertension  Follow - Up      HPI:   This is a 80year old male coming in for follow-up. He still feels weak. He still walks with a shuffle.   He said he has not n neoplasm of colon ([facility] 8/5/2011   • Primary thrombocytopenia ([facility] 7/22/2011   • Restless leg syndrome 12/29/2009   • Sarcoidosis 5/16/2017   • Stenosis of carotid artery 5/16/2017     Past Surgical History:  9/2010: ANGIOPLASTY (CORONARY)  2007: CAROTID CONSTITUTIONAL: He still feels weak and walks with a shuffle. EENT:  Eyes:  Denies eye pain, visual loss, blurred vision, double vision or yellow sclerae. Ears, Nose, Throat:  Denies hearing loss, sneezing, congestion, runny nose or sore throat.   INTEGU conjunctivae clear bilaterally, no eye discharge Ears: External normal. Nose: patent, no nasal discharge Throat:  No tonsillar erythema or exudate. Mouth:  No oral lesions or ulcerations, good dentition. NECK: Supple, no CLAD, no JVD, no thyromegaly.   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

## 2018-01-12 NOTE — TELEPHONE ENCOUNTER
Pharmacist states that Clonazapam 1mg still had the Qpm dose listed along with 3x daily. Clarifying that Clonazapam is just to be 3x daily only. Will need new Rx faxed showing corrected directions. Please advise.   Scarlett Vargas, 01/12/18, 10:33 AM

## 2018-01-13 ENCOUNTER — TELEPHONE (OUTPATIENT)
Dept: FAMILY MEDICINE CLINIC | Facility: CLINIC | Age: 83
End: 2018-01-13

## 2018-01-13 NOTE — TELEPHONE ENCOUNTER
Patient states He has had loose stools since starting Sertraline. Daphney Avlarez has had Diarrhea. States He is stopping Sertraline. Please advise.   Madelaine Piedra, 01/13/18, 9:49 AM

## 2018-01-22 ENCOUNTER — TELEPHONE (OUTPATIENT)
Dept: FAMILY MEDICINE CLINIC | Facility: CLINIC | Age: 83
End: 2018-01-22

## 2018-01-22 RX ORDER — CLONAZEPAM 1 MG/1
1 TABLET ORAL NIGHTLY PRN
COMMUNITY
Start: 2018-01-22 | End: 2018-07-05

## 2018-01-22 RX ORDER — AMLODIPINE BESYLATE 5 MG/1
5 TABLET ORAL NIGHTLY
Refills: 0 | COMMUNITY
Start: 2018-01-22 | End: 2018-05-15

## 2018-01-22 NOTE — TELEPHONE ENCOUNTER
Patient states He has had loose stools x3 weeks. Feels it is from the Clonazepam.    Asking if He can take 1 Amlodipine/ 1 Ramipril and 1 Clonazepam at Night.

## 2018-01-22 NOTE — TELEPHONE ENCOUNTER
Note was accidentally signed. Please advise the Amlodipine/Ramipril daily and Clonazapam 1 at night.   Stephani Solorzano, 01/22/18, 1:48 PM

## 2018-01-22 NOTE — TELEPHONE ENCOUNTER
Amlodipine caused facial flushing. He can restart it if he would like. OK to change meds to Amlodipine and Ramipril and Clonazepam at night. No Residual Tumor Seen Histology Text: There were no malignant cells seen in the sections examined.

## 2018-01-22 NOTE — TELEPHONE ENCOUNTER
Patient informed of below. Expressed understanding. He has already picked up an Rx of Amlodipine.   Boni Miller, 01/22/18, 4:36 PM

## 2018-01-30 ENCOUNTER — MED REC SCAN ONLY (OUTPATIENT)
Dept: FAMILY MEDICINE CLINIC | Facility: CLINIC | Age: 83
End: 2018-01-30

## 2018-01-31 ENCOUNTER — HOSPITAL (OUTPATIENT)
Dept: OTHER | Age: 83
End: 2018-01-31
Attending: INTERNAL MEDICINE

## 2018-02-15 ENCOUNTER — TELEPHONE (OUTPATIENT)
Dept: FAMILY MEDICINE CLINIC | Facility: CLINIC | Age: 83
End: 2018-02-15

## 2018-02-15 NOTE — TELEPHONE ENCOUNTER
Informed Gala patient's daughter of the following below. Daughter is wanting to speak with Wanda Holman tomorrow about getting an appt set up. OTW until Friday.

## 2018-02-15 NOTE — TELEPHONE ENCOUNTER
Patient was just discharged from [facility] [place] yesterday after 3 weeks of rehab. Rehab facility forgot to give them an order for PT/OT at home. Asking if Dr. Mike Cardoza would be able to write the order?   Daughter has a specific physical therapy facili

## 2018-02-16 NOTE — TELEPHONE ENCOUNTER
Per Trenton Bhat-  Patient discharged from [facility].  States will be using a different [address] Requesting order for PT/OT to this Nursing Service. Trenton Bhat will call back on Monday with name of Home Health Service.   Ellen Goel, 02/

## 2018-02-21 ENCOUNTER — MED REC SCAN ONLY (OUTPATIENT)
Dept: FAMILY MEDICINE CLINIC | Facility: CLINIC | Age: 83
End: 2018-02-21

## 2018-02-27 ENCOUNTER — OFFICE VISIT (OUTPATIENT)
Dept: FAMILY MEDICINE CLINIC | Facility: CLINIC | Age: 83
End: 2018-02-27

## 2018-02-27 VITALS
TEMPERATURE: 97 F | BODY MASS INDEX: 24.73 KG/M2 | SYSTOLIC BLOOD PRESSURE: 142 MMHG | OXYGEN SATURATION: 97 % | RESPIRATION RATE: 18 BRPM | DIASTOLIC BLOOD PRESSURE: 62 MMHG | WEIGHT: 167 LBS | HEART RATE: 80 BPM | HEIGHT: 69 IN

## 2018-02-27 DIAGNOSIS — J44.9 CHRONIC OBSTRUCTIVE PULMONARY DISEASE, UNSPECIFIED COPD TYPE (HCC): ICD-10-CM

## 2018-02-27 DIAGNOSIS — F32.1 CURRENT MODERATE EPISODE OF MAJOR DEPRESSIVE DISORDER WITHOUT PRIOR EPISODE (HCC): ICD-10-CM

## 2018-02-27 DIAGNOSIS — G20 PARKINSON'S DISEASE (HCC): Primary | ICD-10-CM

## 2018-02-27 PROBLEM — R42 DIZZINESS: Status: ACTIVE | Noted: 2018-01-25

## 2018-02-27 PROBLEM — R53.1 GENERALIZED WEAKNESS: Status: ACTIVE | Noted: 2018-01-26

## 2018-02-27 PROBLEM — Z86.73 HISTORY OF TIA (TRANSIENT ISCHEMIC ATTACK): Status: ACTIVE | Noted: 2018-01-26

## 2018-02-27 PROCEDURE — 99214 OFFICE O/P EST MOD 30 MIN: CPT | Performed by: FAMILY MEDICINE

## 2018-02-27 RX ORDER — LISINOPRIL 10 MG/1
20 TABLET ORAL DAILY
COMMUNITY
End: 2018-02-27

## 2018-02-27 RX ORDER — PANTOPRAZOLE SODIUM 40 MG/1
40 TABLET, DELAYED RELEASE ORAL EVERY OTHER DAY
COMMUNITY
End: 2018-11-20

## 2018-02-27 RX ORDER — RAMIPRIL 10 MG/1
10 CAPSULE ORAL DAILY
Qty: 90 CAPSULE | Refills: 3 | Status: SHIPPED | OUTPATIENT
Start: 2018-02-27 | End: 2018-05-29

## 2018-02-28 NOTE — PROGRESS NOTES
[address]  PROGRESS NOTE  Chief Complaint:   Patient presents with: Follow - Up: Post rehab facility      HPI:   This is a 80year old male coming in for follow-up.   He was admitted to [facility] January 25 due to progressive Non HDL Chol 145 (H) <130 mg/dL   -URIC ACID, SERUM   Result Value Ref Range   Uric Acid 4.6 2.4 - 8.7 mg/dL   -TSH+FREE T4   Result Value Ref Range   Free T4 1.0 0.9 - 1.8 ng/dL   TSH 4.340 0.350 - 5.500 mIU/mL       Past Medical History:   Diagnosis Da mouth daily. Disp:  Rfl:    carbidopa-levodopa  MG Oral Tab Take 1 tablet by mouth 3 (three) times daily. Disp: 90 tablet Rfl: 1   ramipril 10 MG Oral Cap Take 1 capsule (10 mg total) by mouth daily.  Disp: 90 capsule Rfl: 3   ClonazePAM 1 MG Oral Tab kg/m²  Estimated body mass index is 24.66 kg/m² as calculated from the following:    Height as of this encounter: 69\". Weight as of this encounter: 167 lb. Vital signs reviewed. Appears stated age, well groomed.   Physical Exam:  GEN:  Patient is alert now.    While he was in the hospital in the rehab facility they switched his ramipril to lisinopril and his omeprazole to pantoprazole. He would like to go back to his original prescriptions.       Meds & Refills for this Visit:  Signed Prescriptions Disp

## 2018-03-09 ENCOUNTER — MED REC SCAN ONLY (OUTPATIENT)
Dept: FAMILY MEDICINE CLINIC | Facility: CLINIC | Age: 83
End: 2018-03-09

## 2018-03-13 ENCOUNTER — TELEPHONE (OUTPATIENT)
Dept: FAMILY MEDICINE CLINIC | Facility: CLINIC | Age: 83
End: 2018-03-13

## 2018-03-13 DIAGNOSIS — G20 PARKINSON DISEASE (HCC): Primary | ICD-10-CM

## 2018-03-14 ENCOUNTER — TELEPHONE (OUTPATIENT)
Dept: FAMILY MEDICINE CLINIC | Facility: CLINIC | Age: 83
End: 2018-03-14

## 2018-03-14 RX ORDER — LISINOPRIL 20 MG/1
20 TABLET ORAL
Refills: 0 | COMMUNITY
Start: 2018-02-14 | End: 2018-03-14

## 2018-03-14 NOTE — TELEPHONE ENCOUNTER
Lisinopril has been removed from med list.  Increase Sinemet to 1 po every 6 hours (4 tablets daily).

## 2018-03-14 NOTE — TELEPHONE ENCOUNTER
Patient states refill request for Lisinopril was incorrect and to disregard fax from [employer]. States He is taking Ramipril--Does not need refill. Patient is asking if His Sinemet can be increased?   States the medication wears off before He can take anoth

## 2018-03-14 NOTE — TELEPHONE ENCOUNTER
M/L for Patient to call back. Lisinopril added for HTN? Mitul Wilson, 03/14/18, 11:38 AM        Future appt:     Your appointments     Date & Time Appointment Department [facility])    Apr 09, 2018  8:30 AM CDT Follow up with MD Mara Phillips

## 2018-03-21 ENCOUNTER — TELEPHONE (OUTPATIENT)
Dept: FAMILY MEDICINE CLINIC | Facility: CLINIC | Age: 83
End: 2018-03-21

## 2018-03-21 ENCOUNTER — HOSPITAL ENCOUNTER (OUTPATIENT)
Dept: GENERAL RADIOLOGY | Age: 83
Discharge: HOME | End: 2018-03-21
Attending: FAMILY MEDICINE
Payer: MEDICARE

## 2018-03-21 ENCOUNTER — OFFICE VISIT (OUTPATIENT)
Dept: FAMILY MEDICINE CLINIC | Facility: CLINIC | Age: 83
End: 2018-03-21

## 2018-03-21 VITALS
WEIGHT: 171.38 LBS | TEMPERATURE: 97 F | DIASTOLIC BLOOD PRESSURE: 68 MMHG | HEART RATE: 102 BPM | RESPIRATION RATE: 18 BRPM | BODY MASS INDEX: 25 KG/M2 | SYSTOLIC BLOOD PRESSURE: 150 MMHG

## 2018-03-21 DIAGNOSIS — G20 PARKINSON'S DISEASE (HCC): ICD-10-CM

## 2018-03-21 DIAGNOSIS — M25.561 ACUTE PAIN OF RIGHT KNEE: ICD-10-CM

## 2018-03-21 DIAGNOSIS — M79.604 PAIN OF RIGHT LEG: ICD-10-CM

## 2018-03-21 DIAGNOSIS — M25.561 ACUTE PAIN OF RIGHT KNEE: Primary | ICD-10-CM

## 2018-03-21 DIAGNOSIS — T14.8XXA MUSCLE TEAR: ICD-10-CM

## 2018-03-21 PROCEDURE — 99214 OFFICE O/P EST MOD 30 MIN: CPT | Performed by: FAMILY MEDICINE

## 2018-03-21 PROCEDURE — 73560 X-RAY EXAM OF KNEE 1 OR 2: CPT | Performed by: FAMILY MEDICINE

## 2018-03-21 NOTE — TELEPHONE ENCOUNTER
Phone call from ultrasound at [facility].  The venous doppler does not show any evidence for DVT. Gene was informed.

## 2018-03-21 NOTE — PROGRESS NOTES
[facility] [place]  PROGRESS NOTE  Chief Complaint:   Patient presents with:  Knee Pain: Swollen, bruised and difficult to walk on      HPI:   This is a 80year old male coming in for pain in his right knee.   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 EENT:  Eyes:  Denies eye pain, visual loss, blurred vision, double vision or yellow sclerae.  Ears, Nose, Throat:  Vandana Edmonds oral lesions or ulcerations, good dentition. NECK: Supple, no CLAD, no JVD, no thyromegaly. SKIN: No rashes, no skin lesion, no bruising, good turgor. HEART:  Regular rate and rhythm, no murmurs, rubs or gallops.   LUNGS: Clear to auscultation bilterally prescriptions requested or ordered in this encounter    Health Maintenance: There are no preventive care reminders to display for this patient. Patient/Caregiver Education: Patient/Caregiver Education: There are no barriers to learning.  Medical educati

## 2018-03-21 NOTE — TELEPHONE ENCOUNTER
patient is wanting to come in to see dr Carmen Tello for his leg, said the is pretty swollen and painful, but there is no avail time today not with anyone else- wants to speak to nurse

## 2018-03-21 NOTE — PATIENT INSTRUCTIONS
Have a venous doppler test done at [address] [place] [address] today. If negative, treatment is compression.

## 2018-04-04 ENCOUNTER — MED REC SCAN ONLY (OUTPATIENT)
Dept: FAMILY MEDICINE CLINIC | Facility: CLINIC | Age: 83
End: 2018-04-04

## 2018-04-10 ENCOUNTER — TELEPHONE (OUTPATIENT)
Dept: FAMILY MEDICINE CLINIC | Facility: CLINIC | Age: 83
End: 2018-04-10

## 2018-04-10 RX ORDER — ROPINIROLE 0.5 MG/1
1 TABLET, FILM COATED ORAL AS NEEDED
Refills: 3 | COMMUNITY
Start: 2018-04-06 | End: 2018-05-29 | Stop reason: DRUGHIGH

## 2018-04-10 NOTE — TELEPHONE ENCOUNTER
Patient states He was informed to start taking generic lipitor. This is not on His [facility] Discharge Med Reconcile. Patient states He has enough until He sees Dr Nayana Salcido next week. Will discuss this medication further at time of visit/agrees.   Marly

## 2018-04-17 ENCOUNTER — OFFICE VISIT (OUTPATIENT)
Dept: FAMILY MEDICINE CLINIC | Facility: CLINIC | Age: 83
End: 2018-04-17

## 2018-04-17 VITALS
DIASTOLIC BLOOD PRESSURE: 48 MMHG | BODY MASS INDEX: 22.38 KG/M2 | HEIGHT: 72 IN | SYSTOLIC BLOOD PRESSURE: 134 MMHG | HEART RATE: 78 BPM | TEMPERATURE: 97 F | WEIGHT: 165.25 LBS | OXYGEN SATURATION: 98 % | RESPIRATION RATE: 14 BRPM

## 2018-04-17 DIAGNOSIS — R55 VASOVAGAL SYNCOPE: ICD-10-CM

## 2018-04-17 DIAGNOSIS — R35.0 URINARY FREQUENCY: Primary | ICD-10-CM

## 2018-04-17 DIAGNOSIS — G20 PARKINSON'S DISEASE (HCC): ICD-10-CM

## 2018-04-17 DIAGNOSIS — G45.1 HEMISPHERIC CAROTID ARTERY SYNDROME: ICD-10-CM

## 2018-04-17 DIAGNOSIS — Z86.73 HISTORY OF TIA (TRANSIENT ISCHEMIC ATTACK): ICD-10-CM

## 2018-04-17 PROBLEM — I65.21 CAROTID STENOSIS, RIGHT: Status: ACTIVE | Noted: 2017-05-16

## 2018-04-17 PROBLEM — G47.33 OSA (OBSTRUCTIVE SLEEP APNEA): Status: ACTIVE | Noted: 2018-04-08

## 2018-04-17 PROBLEM — R41.89 EPISODE OF UNRESPONSIVENESS: Status: ACTIVE | Noted: 2018-04-08

## 2018-04-17 PROBLEM — G45.9 TRANSIENT CEREBRAL ISCHEMIA: Status: ACTIVE | Noted: 2018-04-07

## 2018-04-17 PROCEDURE — 81003 URINALYSIS AUTO W/O SCOPE: CPT | Performed by: FAMILY MEDICINE

## 2018-04-17 PROCEDURE — 99214 OFFICE O/P EST MOD 30 MIN: CPT | Performed by: FAMILY MEDICINE

## 2018-04-17 PROCEDURE — 1111F DSCHRG MED/CURRENT MED MERGE: CPT | Performed by: FAMILY MEDICINE

## 2018-04-17 NOTE — PROGRESS NOTES
[facility] [place]  PROGRESS NOTE  Chief Complaint:   Patient presents with:  Hospital F/U  Low Back Pain  Constipation      HPI:   This is a 80year old male coming in for hospital follow-up.   He was seen at [facility] April 7 with a s Clear   URINE-COLOR dark yellow Yellow   Multistix Lot# [id number] Numeric   Multistix Expiration Date 04/30/2019 Date       Past Medical History:   Diagnosis Date   • Anxiety    • Arthritis    • Cancer [facility])     colon   • COPD (chronic obstructive pulmonary times daily. Disp: 120 tablet Rfl: 5   Pantoprazole Sodium 40 MG Oral Tab EC Take 40 mg by mouth daily. Disp:  Rfl:    ramipril 10 MG Oral Cap Take 1 capsule (10 mg total) by mouth daily.  Disp: 90 capsule Rfl: 3   ClonazePAM 1 MG Oral Tab Take 1 tablet (1 calculated from the following:    Height as of this encounter: 72\". Weight as of this encounter: 165 lb 4 oz. Vital signs reviewed. Physical Exam:  GEN:  Patient is alert, awake and oriented, well developed, well nourished, no apparent distress.   He syndrome. 4. Vasovagal syncope  He has a history of syncope. 5. 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 It is stable at present. No changes recommended in medications today.   His urinaly

## 2018-05-15 ENCOUNTER — OFFICE VISIT (OUTPATIENT)
Dept: FAMILY MEDICINE CLINIC | Facility: CLINIC | Age: 83
End: 2018-05-15

## 2018-05-15 VITALS
HEIGHT: 69 IN | WEIGHT: 166.38 LBS | DIASTOLIC BLOOD PRESSURE: 62 MMHG | HEART RATE: 82 BPM | SYSTOLIC BLOOD PRESSURE: 140 MMHG | RESPIRATION RATE: 16 BRPM | TEMPERATURE: 96 F | BODY MASS INDEX: 24.64 KG/M2

## 2018-05-15 DIAGNOSIS — Z86.73 HISTORY OF TIA (TRANSIENT ISCHEMIC ATTACK): ICD-10-CM

## 2018-05-15 DIAGNOSIS — G25.81 RESTLESS LEGS SYNDROME: ICD-10-CM

## 2018-05-15 DIAGNOSIS — J44.9 CHRONIC OBSTRUCTIVE PULMONARY DISEASE, UNSPECIFIED COPD TYPE (HCC): ICD-10-CM

## 2018-05-15 DIAGNOSIS — I65.22 LEFT CAROTID ARTERY OCCLUSION: ICD-10-CM

## 2018-05-15 DIAGNOSIS — I10 BENIGN ESSENTIAL HYPERTENSION: Primary | ICD-10-CM

## 2018-05-15 DIAGNOSIS — G20 PARKINSON'S DISEASE (HCC): ICD-10-CM

## 2018-05-15 PROBLEM — J18.9 CAP (COMMUNITY ACQUIRED PNEUMONIA): Status: RESOLVED | Noted: 2017-11-25 | Resolved: 2018-05-15

## 2018-05-15 PROBLEM — K92.2 GI BLEED: Status: RESOLVED | Noted: 2017-12-05 | Resolved: 2018-05-15

## 2018-05-15 PROBLEM — K29.00 ACUTE GASTRITIS WITHOUT HEMORRHAGE: Status: RESOLVED | Noted: 2017-12-04 | Resolved: 2018-05-15

## 2018-05-15 PROBLEM — B37.81 CANDIDA ESOPHAGITIS (HCC): Status: RESOLVED | Noted: 2017-12-07 | Resolved: 2018-05-15

## 2018-05-15 PROCEDURE — 99214 OFFICE O/P EST MOD 30 MIN: CPT | Performed by: FAMILY MEDICINE

## 2018-05-15 RX ORDER — ATORVASTATIN CALCIUM 10 MG/1
10 TABLET, FILM COATED ORAL NIGHTLY
Qty: 90 TABLET | Refills: 3 | Status: SHIPPED | OUTPATIENT
Start: 2018-05-15 | End: 2019-05-07

## 2018-05-15 RX ORDER — AMLODIPINE BESYLATE 5 MG/1
5 TABLET ORAL DAILY
Qty: 90 TABLET | Refills: 3 | Status: SHIPPED | OUTPATIENT
Start: 2018-05-15 | End: 2018-06-13

## 2018-05-15 NOTE — PROGRESS NOTES
[address]  PROGRESS NOTE  Chief Complaint:   Patient presents with: Follow - Up      HPI:   This is a 80year old male coming in for follow-up. He said that he continues to take his Sinemet 4 times a day.   He feels like it is helpfu URINE 7.0 4.5 - 8.0   PROTEIN (URINE DIPSTICK) trace Negative/Trace mg/dL   UROBILINOGEN,SEMI-QN 0.2 0.0 - 1.9 mg/dL   NITRITE, URINE neg Negative   LEUKOCYTES neg Negative   APPEARANCE clear Clear   URINE-COLOR dark yellow Yellow   Multistix Lot# [id number] Outpatient Prescriptions:  atorvastatin 10 MG Oral Tab Take 1 tablet (10 mg total) by mouth nightly. Disp: 90 tablet Rfl: 3   AmLODIPine Besylate 5 MG Oral Tab Take 1 tablet (5 mg total) by mouth daily.  Disp: 90 tablet Rfl: 3   rOPINIRole HCl 0.5 MG Oral T /62 (BP Location: Right arm, Patient Position: Sitting, Cuff Size: adult)   Pulse 82   Temp (!) 96.1 °F (35.6 °C) (Tympanic)   Resp 16   Ht 69\"   Wt 166 lb 6 oz   BMI 24.57 kg/m²  Estimated body mass index is 24.57 kg/m² as calculated from the fol Parkinson's disease ([facility]  His Parkinson's is slowly and progressively getting worse. No changes recommended in his medications today. 5. Restless legs syndrome  His restless leg syndrome is also slowly and progressively getting worse.   Plan: No change episode ([facility]     Hyponatremia     Dizziness     Generalized weakness     History of TIA (transient ischemic attack)     Parkinson's disease ([facility]     Acute pain of right knee     Muscle tear     Episode of unresponsiveness     MADELEINE (obstructive sleep apnea

## 2018-05-21 ENCOUNTER — MED REC SCAN ONLY (OUTPATIENT)
Dept: FAMILY MEDICINE CLINIC | Facility: CLINIC | Age: 83
End: 2018-05-21

## 2018-05-21 ENCOUNTER — TELEPHONE (OUTPATIENT)
Dept: FAMILY MEDICINE CLINIC | Facility: CLINIC | Age: 83
End: 2018-05-21

## 2018-05-29 ENCOUNTER — OFFICE VISIT (OUTPATIENT)
Dept: FAMILY MEDICINE CLINIC | Facility: CLINIC | Age: 83
End: 2018-05-29

## 2018-05-29 VITALS
HEIGHT: 68 IN | SYSTOLIC BLOOD PRESSURE: 138 MMHG | WEIGHT: 167.38 LBS | BODY MASS INDEX: 25.37 KG/M2 | RESPIRATION RATE: 16 BRPM | DIASTOLIC BLOOD PRESSURE: 70 MMHG | TEMPERATURE: 97 F | HEART RATE: 80 BPM
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
## 2018-05-29 DIAGNOSIS — G20 PARKINSON'S DISEASE (HCC): ICD-10-CM

## 2018-05-29 DIAGNOSIS — J43.1 PANLOBULAR EMPHYSEMA (HCC): ICD-10-CM

## 2018-05-29 DIAGNOSIS — S22.000D CLOSED COMPRESSION FRACTURE OF THORACIC VERTEBRA WITH ROUTINE HEALING, SUBSEQUENT ENCOUNTER: ICD-10-CM

## 2018-05-29 DIAGNOSIS — I10 BENIGN ESSENTIAL HYPERTENSION: ICD-10-CM

## 2018-05-29 PROBLEM — S22.000A CLOSED COMPRESSION FRACTURE OF THORACIC VERTEBRA (HCC): Status: ACTIVE | Noted: 2018-05-16

## 2018-05-29 PROBLEM — R09.02 HYPOXIA: Status: ACTIVE | Noted: 2018-05-16
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
## 2018-05-29 PROCEDURE — 99214 OFFICE O/P EST MOD 30 MIN: CPT | Performed by: FAMILY MEDICINE

## 2018-05-29 PROCEDURE — 1111F DSCHRG MED/CURRENT MED MERGE: CPT | Performed by: FAMILY MEDICINE

## 2018-05-29 RX ORDER — ROPINIROLE 1 MG/1
1 TABLET, FILM COATED ORAL 2 TIMES DAILY
Refills: 3 | COMMUNITY
Start: 2018-05-20

## 2018-05-29 RX ORDER — ASPIRIN 325 MG
325 TABLET, DELAYED RELEASE (ENTERIC COATED) ORAL DAILY
COMMUNITY

## 2018-05-29 RX ORDER — IPRATROPIUM BROMIDE AND ALBUTEROL SULFATE 2.5; .5 MG/3ML; MG/3ML
3 SOLUTION RESPIRATORY (INHALATION) 2 TIMES DAILY PRN
Refills: 0 | COMMUNITY
Start: 2018-05-19 | End: 2019-07-18

## 2018-05-29 RX ORDER — CHOLECALCIFEROL (VITAMIN D3) 25 MCG
1 TABLET,CHEWABLE ORAL DAILY
COMMUNITY
End: 2018-08-15

## 2018-05-29 NOTE — PROGRESS NOTES
[address] [place] [address]  PROGRESS NOTE  Chief Complaint:   Patient presents with:  Hospital F/U: Now on O2 at Night/ Continue Nebulizer Treatments? HPI:   This is a 80year old male coming in for follow-up.   He was seen here in the office on May DIPSTICK) neg Negative mg/dL   SPECIFIC GRAVITY 1.010 1.005 - 1.030   OCCULT BLOOD neg Negative   PH, URINE 7.0 4.5 - 8.0   PROTEIN (URINE DIPSTICK) trace Negative/Trace mg/dL   UROBILINOGEN,SEMI-QN 0.2 0.0 - 1.9 mg/dL   NITRITE, URINE neg Negative   LEUKO Comment:Other reaction(s): swollen tongue  Trimethoprim            HIVES  Current Meds:    Current Outpatient Prescriptions:  aspirin  MG Oral Tab EC Take 325 mg by mouth daily.  Disp:  Rfl:    Cyanocobalamin (B-12) 2500 MCG Oral Tab Ta bleeding or bruising. LYMPHATICS:  Denies enlarged nodes or history of splenectomy. PSYCHIATRIC:  Denies depression or anxiety. ENDOCRINOLOGIC:  Denies excessive sweating, cold or heat intolerance, polyuria or polydipsia.   ALLERGIES:  Denies allergic re bilaterally. NEURO:  No deficit, normal gait, strength and tone, sensory intact, normal reflexes. ASSESSMENT AND PLAN:   1. Fall, subsequent encounter  He fell at home with loss of consciousness but no good explanation for the cause of the fall.   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

## 2018-06-13 ENCOUNTER — OFFICE VISIT (OUTPATIENT)
Dept: FAMILY MEDICINE CLINIC | Facility: CLINIC | Age: 83
End: 2018-06-13

## 2018-06-13 VITALS
OXYGEN SATURATION: 98 % | BODY MASS INDEX: 25.03 KG/M2 | DIASTOLIC BLOOD PRESSURE: 66 MMHG | SYSTOLIC BLOOD PRESSURE: 138 MMHG | TEMPERATURE: 96 F | RESPIRATION RATE: 18 BRPM | WEIGHT: 165.13 LBS | HEART RATE: 84 BPM | HEIGHT: 68 IN

## 2018-06-13 DIAGNOSIS — J43.1 PANLOBULAR EMPHYSEMA (HCC): ICD-10-CM

## 2018-06-13 DIAGNOSIS — G20 PARKINSON'S DISEASE (HCC): ICD-10-CM

## 2018-06-13 DIAGNOSIS — S22.000D CLOSED COMPRESSION FRACTURE OF THORACIC VERTEBRA WITH ROUTINE HEALING, SUBSEQUENT ENCOUNTER: ICD-10-CM

## 2018-06-13 DIAGNOSIS — I10 BENIGN ESSENTIAL HYPERTENSION: Primary | ICD-10-CM

## 2018-06-13 PROCEDURE — 99214 OFFICE O/P EST MOD 30 MIN: CPT | Performed by: FAMILY MEDICINE

## 2018-06-13 RX ORDER — RAMIPRIL 10 MG/1
10 CAPSULE ORAL DAILY
COMMUNITY
End: 2018-08-28

## 2018-06-13 NOTE — PROGRESS NOTES
[address]  PROGRESS NOTE  Chief Complaint:   Patient presents with: Follow - Up      HPI:   This is a 80year old male coming in for follow-up. He said that the amlodipine was making his face burn at bedtime.   He did not like the wa • Stenosis of carotid artery 5/16/2017     Past Surgical History:  9/2010: ANGIOPLASTY (CORONARY)  2007: CAROTID ENDARTERECTOMY Right  No date: CATARACT  No date: CHOLECYSTECTOMY  2011: COLECTOMY  No date: COLONOSCOPY  2011: LAPAROSCOPY, SURGICAL PROSTAT ClonazePAM 1 MG Oral Tab Take 1 tablet (1 mg total) by mouth nightly as needed for Anxiety. Disp:  Rfl:       Counseling given: Not Answered       REVIEW OF SYSTEMS:   CONSTITUTIONAL:  Denies unusual weight gain/loss, fever, chills, or fatigue.   EENT:  E previously. When I asked him about it he said that when he sits up straight is back hurts a little bit more. When he leans forward it is less uncomfortable.   HEENT:  Head:  Normocephalic, atraumatic Eyes: EOMI, PERRLA, no scleral icterus, conjunctivae cl closed compression fracture of T8 and T11. They are improving.  - CBC WITH DIFFERENTIAL WITH PLATELET; Future  - COMP METABOLIC PANEL (14);  Future      Meds & Refills for this Visit:  No prescriptions requested or ordered in this encounter       Ankita Ferraro

## 2018-07-05 RX ORDER — CLONAZEPAM 1 MG/1
1 TABLET ORAL NIGHTLY PRN
Qty: 40 TABLET | Refills: 0 | Status: SHIPPED
Start: 2018-07-05 | End: 2018-08-13

## 2018-07-05 NOTE — TELEPHONE ENCOUNTER
Received a fax questing refill for Clanazepam 1mg tid.      Future Appointments  Date Time Provider Wilfred Vida   9/5/2018 9:15 AM REF [place] [facility]   9/12/2018 8:00 AM Luisa Grady MD [facility] [place] [facility]

## 2018-07-25 ENCOUNTER — LAB ENCOUNTER (OUTPATIENT)
Dept: LAB | Age: 83
End: 2018-07-25
Attending: FAMILY MEDICINE
Payer: MEDICARE

## 2018-07-25 ENCOUNTER — OFFICE VISIT (OUTPATIENT)
Dept: FAMILY MEDICINE CLINIC | Facility: CLINIC | Age: 83
End: 2018-07-25
Payer: MEDICARE

## 2018-07-25 VITALS
HEART RATE: 78 BPM | WEIGHT: 167.63 LBS | SYSTOLIC BLOOD PRESSURE: 178 MMHG | HEIGHT: 68 IN | DIASTOLIC BLOOD PRESSURE: 78 MMHG | TEMPERATURE: 98 F | BODY MASS INDEX: 25.41 KG/M2

## 2018-07-25 DIAGNOSIS — G25.81 RESTLESS LEGS SYNDROME: ICD-10-CM

## 2018-07-25 DIAGNOSIS — D86.9 SARCOIDOSIS: ICD-10-CM

## 2018-07-25 DIAGNOSIS — G45.1 HEMISPHERIC CAROTID ARTERY SYNDROME: ICD-10-CM

## 2018-07-25 DIAGNOSIS — F41.9 ANXIETY: ICD-10-CM

## 2018-07-25 DIAGNOSIS — I10 BENIGN ESSENTIAL HYPERTENSION: ICD-10-CM

## 2018-07-25 DIAGNOSIS — E87.1 HYPONATREMIA: Primary | ICD-10-CM

## 2018-07-25 DIAGNOSIS — E87.1 HYPONATREMIA: ICD-10-CM

## 2018-07-25 DIAGNOSIS — G20 PARKINSON'S DISEASE (HCC): ICD-10-CM

## 2018-07-25 DIAGNOSIS — R55 VASOVAGAL SYNCOPE: ICD-10-CM

## 2018-07-25 LAB
ANION GAP SERPL CALC-SCNC: 7 MMOL/L (ref 0–18)
BASOPHILS # BLD AUTO: 0.03 X10(3) UL (ref 0–0.1)
BASOPHILS NFR BLD AUTO: 0.6 %
BUN BLD-MCNC: 15 MG/DL (ref 8–20)
BUN/CREAT SERPL: 16.7 (ref 10–20)
CALCIUM BLD-MCNC: 8.9 MG/DL (ref 8.3–10.3)
CHLORIDE SERPL-SCNC: 94 MMOL/L (ref 101–111)
CO2 SERPL-SCNC: 28 MMOL/L (ref 22–32)
CREAT BLD-MCNC: 0.9 MG/DL (ref 0.7–1.3)
EOSINOPHIL # BLD AUTO: 0.26 X10(3) UL (ref 0–0.3)
EOSINOPHIL NFR BLD AUTO: 5.2 %
ERYTHROCYTE [DISTWIDTH] IN BLOOD BY AUTOMATED COUNT: 13.6 % (ref 11.5–16)
GLUCOSE BLD-MCNC: 101 MG/DL (ref 70–99)
HCT VFR BLD AUTO: 39 % (ref 37–53)
HGB BLD-MCNC: 12.6 G/DL (ref 13–17)
IMMATURE GRANULOCYTE COUNT: 0.01 X10(3) UL (ref 0–1)
IMMATURE GRANULOCYTE RATIO %: 0.2 %
LYMPHOCYTES # BLD AUTO: 1.31 X10(3) UL (ref 0.9–4)
LYMPHOCYTES NFR BLD AUTO: 26 %
MCH RBC QN AUTO: 28.4 PG (ref 27–33.2)
MCHC RBC AUTO-ENTMCNC: 32.3 G/DL (ref 31–37)
MCV RBC AUTO: 87.8 FL (ref 80–99)
MONOCYTES # BLD AUTO: 0.64 X10(3) UL (ref 0.1–1)
MONOCYTES NFR BLD AUTO: 12.7 %
NEUTROPHIL ABS PRELIM: 2.79 X10 (3) UL (ref 1.3–6.7)
NEUTROPHILS # BLD AUTO: 2.79 X10(3) UL (ref 1.3–6.7)
NEUTROPHILS NFR BLD AUTO: 55.3 %
OSMOLALITY SERPL CALC.SUM OF ELEC: 269 MOSM/KG (ref 275–295)
PLATELET # BLD AUTO: 238 10(3)UL (ref 150–450)
POTASSIUM SERPL-SCNC: 4.8 MMOL/L (ref 3.6–5.1)
RBC # BLD AUTO: 4.44 X10(6)UL (ref 3.8–5.8)
RED CELL DISTRIBUTION WIDTH-SD: 43.9 FL (ref 35.1–46.3)
SODIUM SERPL-SCNC: 129 MMOL/L (ref 136–144)
WBC # BLD AUTO: 5 X10(3) UL (ref 4–13)

## 2018-07-25 PROCEDURE — 1111F DSCHRG MED/CURRENT MED MERGE: CPT | Performed by: FAMILY MEDICINE

## 2018-07-25 PROCEDURE — 85025 COMPLETE CBC W/AUTO DIFF WBC: CPT

## 2018-07-25 PROCEDURE — 80048 BASIC METABOLIC PNL TOTAL CA: CPT

## 2018-07-25 PROCEDURE — 36415 COLL VENOUS BLD VENIPUNCTURE: CPT

## 2018-07-25 PROCEDURE — 99214 OFFICE O/P EST MOD 30 MIN: CPT | Performed by: FAMILY MEDICINE

## 2018-07-25 NOTE — PROGRESS NOTES
[facility]  [facility]  Chief Complaint:   Patient presents with:  Hospital F/U: 7/9-7/12 hyponatremia      HPI:   This is a 80year old male coming in for hospital follow-up.   He was admitted to [facility] July 9 - July 12 w Date   • Anxiety    • Arthritis    • Cancer [facility])     colon   • COPD (chronic obstructive pulmonary disease) ([facility]    • Eczema 4/9/2012   • Esophageal reflux    • Essential hypertension    • Hypercalcemia 10/7/2014   • Malignant neoplasm of colon ([facility] 8/5 Disp:  Rfl:    rOPINIRole HCl 1 MG Oral Tab Take 1 tablet by mouth every evening. Disp:  Rfl: 3   ipratropium-albuterol 0.5-2.5 (3) MG/3ML Inhalation Solution Take 3 mL by nebulization every 4 (four) hours as needed.  Disp:  Rfl: 0   atorvastatin 10 MG Oral index is 25.48 kg/m² as calculated from the following:    Height as of this encounter: 68\". Weight as of this encounter: 167 lb 9.6 oz. Vital signs reviewed. Appears stated age, well groomed. Physical Exam:  GEN: He walked in using a cane.   He is abl artery syndrome  He has a history of a previous stroke with some residual.  - BASIC METABOLIC PANEL (8); Future  - CBC WITH DIFFERENTIAL WITH PLATELET; Future    6.  Restless legs syndrome  He does have severe restless leg syndrome.  - BASIC METABOLIC PANEL Generalized weakness     History of TIA (transient ischemic attack)     Parkinson's disease ([facility]     Acute pain of right knee     Muscle tear     Episode of unresponsiveness     MADELEINE (obstructive sleep apnea)     Transient cerebral ischemia     Closed comp

## 2018-07-26 ENCOUNTER — TELEPHONE (OUTPATIENT)
Dept: FAMILY MEDICINE CLINIC | Facility: CLINIC | Age: 83
End: 2018-07-26

## 2018-07-26 DIAGNOSIS — E87.1 HYPONATREMIA: Primary | ICD-10-CM

## 2018-07-26 NOTE — TELEPHONE ENCOUNTER
Labs are back with no acute findings. Dr. Elia Matthews who ordered the labs is out of the office today. Recommend that patient have Dr. Jessica Shah address and give recommendations tomorrow. Please let patient know. Thank you.  Alanna San, 07/26/18, 2:46 PM

## 2018-07-27 ENCOUNTER — TELEPHONE (OUTPATIENT)
Dept: FAMILY MEDICINE CLINIC | Facility: CLINIC | Age: 83
End: 2018-07-27

## 2018-07-27 NOTE — TELEPHONE ENCOUNTER
Patient informed of below. Expressed understanding. Appt given with Lab Thurs 8/2.   Rob Phillips, 07/27/18, 2:10 PM

## 2018-07-27 NOTE — TELEPHONE ENCOUNTER
----- Message from Chante Palencia MD sent at 7/27/2018  1:03 PM CDT -----  Please call Gene. His blood count is normal.  His blood sugar is just slightly elevated at 101 but that is not significant. However, his sodium has drifted back down to 129.   Kolby Dye

## 2018-08-02 ENCOUNTER — APPOINTMENT (OUTPATIENT)
Dept: LAB | Age: 83
End: 2018-08-02
Attending: FAMILY MEDICINE
Payer: MEDICARE

## 2018-08-02 DIAGNOSIS — E87.1 HYPONATREMIA: ICD-10-CM

## 2018-08-02 LAB
ANION GAP SERPL CALC-SCNC: 9 MMOL/L (ref 0–18)
BUN BLD-MCNC: 17 MG/DL (ref 8–20)
BUN/CREAT SERPL: 19.3 (ref 10–20)
CALCIUM BLD-MCNC: 8.6 MG/DL (ref 8.3–10.3)
CHLORIDE SERPL-SCNC: 97 MMOL/L (ref 101–111)
CO2 SERPL-SCNC: 26 MMOL/L (ref 22–32)
CREAT BLD-MCNC: 0.88 MG/DL (ref 0.7–1.3)
GLUCOSE BLD-MCNC: 79 MG/DL (ref 70–99)
OSMOLALITY SERPL CALC.SUM OF ELEC: 274 MOSM/KG (ref 275–295)
POTASSIUM SERPL-SCNC: 4.2 MMOL/L (ref 3.6–5.1)
SODIUM SERPL-SCNC: 132 MMOL/L (ref 136–144)

## 2018-08-02 PROCEDURE — 80048 BASIC METABOLIC PNL TOTAL CA: CPT

## 2018-08-02 PROCEDURE — 36415 COLL VENOUS BLD VENIPUNCTURE: CPT

## 2018-08-03 ENCOUNTER — TELEPHONE (OUTPATIENT)
Dept: FAMILY MEDICINE CLINIC | Facility: CLINIC | Age: 83
End: 2018-08-03

## 2018-08-03 NOTE — TELEPHONE ENCOUNTER
----- Message from Lenward Epley, MD sent at 8/3/2018  2:21 PM CDT -----  Please call Gene. Good news. His sodium is coming up. It is now up to 132. The rest of his labs look completely normal.  Plan: Continue the same plan for now.

## 2018-08-13 NOTE — TELEPHONE ENCOUNTER
Future appt:     Your appointments     Date & Time Appointment Department [facility])    Aug 21, 2018  2:30 PM CDT Follow up with Goran Arndt MD [address] [place] [address])    Sep 05, 2018  9:15 AM CDT Rob Worthy

## 2018-08-14 RX ORDER — CLONAZEPAM 1 MG/1
1 TABLET ORAL NIGHTLY PRN
Qty: 40 TABLET | Refills: 0 | Status: SHIPPED
Start: 2018-08-14 | End: 2018-08-16

## 2018-08-15 ENCOUNTER — TELEPHONE (OUTPATIENT)
Dept: FAMILY MEDICINE CLINIC | Facility: CLINIC | Age: 83
End: 2018-08-15

## 2018-08-15 ENCOUNTER — APPOINTMENT (OUTPATIENT)
Dept: LAB | Age: 83
End: 2018-08-15
Attending: FAMILY MEDICINE
Payer: MEDICARE

## 2018-08-15 ENCOUNTER — OFFICE VISIT (OUTPATIENT)
Dept: FAMILY MEDICINE CLINIC | Facility: CLINIC | Age: 83
End: 2018-08-15
Payer: MEDICARE

## 2018-08-15 VITALS
BODY MASS INDEX: 25.22 KG/M2 | OXYGEN SATURATION: 96 % | HEART RATE: 65 BPM | TEMPERATURE: 98 F | DIASTOLIC BLOOD PRESSURE: 84 MMHG | HEIGHT: 68 IN | WEIGHT: 166.38 LBS | RESPIRATION RATE: 18 BRPM | SYSTOLIC BLOOD PRESSURE: 182 MMHG

## 2018-08-15 DIAGNOSIS — G45.8 OTHER SPECIFIED TRANSIENT CEREBRAL ISCHEMIAS: ICD-10-CM

## 2018-08-15 DIAGNOSIS — E87.1 HYPONATREMIA: ICD-10-CM

## 2018-08-15 DIAGNOSIS — G20 PARKINSON'S DISEASE (HCC): ICD-10-CM

## 2018-08-15 DIAGNOSIS — R42 DIZZINESS: ICD-10-CM

## 2018-08-15 DIAGNOSIS — I10 BENIGN ESSENTIAL HYPERTENSION: Primary | ICD-10-CM

## 2018-08-15 DIAGNOSIS — G25.81 RESTLESS LEGS SYNDROME: ICD-10-CM

## 2018-08-15 LAB
ANION GAP SERPL CALC-SCNC: 7 MMOL/L (ref 0–18)
BUN BLD-MCNC: 13 MG/DL (ref 8–20)
BUN/CREAT SERPL: 14.6 (ref 10–20)
CALCIUM BLD-MCNC: 8.6 MG/DL (ref 8.3–10.3)
CHLORIDE SERPL-SCNC: 95 MMOL/L (ref 101–111)
CO2 SERPL-SCNC: 29 MMOL/L (ref 22–32)
CREAT BLD-MCNC: 0.89 MG/DL (ref 0.7–1.3)
GLUCOSE BLD-MCNC: 106 MG/DL (ref 70–99)
OSMOLALITY SERPL CALC.SUM OF ELEC: 273 MOSM/KG (ref 275–295)
POTASSIUM SERPL-SCNC: 4.5 MMOL/L (ref 3.6–5.1)
SODIUM SERPL-SCNC: 131 MMOL/L (ref 136–144)

## 2018-08-15 PROCEDURE — 36415 COLL VENOUS BLD VENIPUNCTURE: CPT | Performed by: FAMILY MEDICINE

## 2018-08-15 PROCEDURE — 99213 OFFICE O/P EST LOW 20 MIN: CPT | Performed by: FAMILY MEDICINE

## 2018-08-15 PROCEDURE — 80048 BASIC METABOLIC PNL TOTAL CA: CPT | Performed by: FAMILY MEDICINE

## 2018-08-15 NOTE — PATIENT INSTRUCTIONS
Use oxygen if the \"funny feeling\" comes back again. Take 3 Sinemet a day for 1 week, then go back to two/day. Let us know which way is better.

## 2018-08-15 NOTE — TELEPHONE ENCOUNTER
Gala states Patient feeling very weak today. Blood pressure is fine. Does not feel Patient needing to go to ER. Would like to see Dr Aj Leyva for peace of mind. Appt given Today 1:15 with Dr Aj Leyva.   Coy Wolfe, 08/15/18, 9:13 AM

## 2018-08-15 NOTE — TELEPHONE ENCOUNTER
patient is feelig very weak and faint- is asking for an appt with dr Elaine Alcantar - is not open to seeing another provider

## 2018-08-16 RX ORDER — CLONAZEPAM 1 MG/1
1 TABLET ORAL NIGHTLY PRN
Qty: 30 TABLET | Refills: 0 | Status: SHIPPED
Start: 2018-08-16 | End: 2018-10-24

## 2018-08-16 NOTE — PROGRESS NOTES
[facility] [place]  PROGRESS NOTE  Chief Complaint:   Patient presents with:  Dizziness: dizziness, fatigue, feeling faint      HPI:   This is a 80year old male coming in for feeling awful. He describes it as being dizzy feeling.   He has waves 5/16/2017   • Stenosis of carotid artery 5/16/2017     Past Surgical History:  9/2010: ANGIOPLASTY (CORONARY)  2007: CAROTID ENDARTERECTOMY Right  No date: CATARACT  No date: CHOLECYSTECTOMY  2011: COLECTOMY  No date: COLONOSCOPY  2011: Markell Sagastume by mouth every other day. Disp:  Rfl:       Counseling given: Not Answered       REVIEW OF SYSTEMS:   CONSTITUTIONAL: See HPI  EENT:  Eyes:  Denies eye pain, visual loss, blurred vision, double vision or yellow sclerae.  Ears, Nose, Throat:  Denies hearin tonsillar erythema or exudate. Mouth:  No oral lesions or ulcerations, good dentition. NECK: Supple, no CLAD, no JVD, no thyromegaly. SKIN: No rashes, no skin lesion, no bruising, good turgor.   HEART:  Regular rate and rhythm, no murmurs, rubs or gallop Health Maintenance: There are no preventive care reminders to display for this patient. Patient/Caregiver Education: Patient/Caregiver Education: There are no barriers to learning. Medical education done.    Outcome: Patient verbalizes understandi

## 2018-08-20 ENCOUNTER — TELEPHONE (OUTPATIENT)
Dept: FAMILY MEDICINE CLINIC | Facility: CLINIC | Age: 83
End: 2018-08-20

## 2018-08-20 NOTE — TELEPHONE ENCOUNTER
Please advise Gene. His sodium level remains low at 131. His kidney function is normal.  His potassium level is normal.  Recommendation: I advised him to buy over-the-counter Gatorade and drink at least one glass of that every day.   That will help to rep

## 2018-08-28 ENCOUNTER — OFFICE VISIT (OUTPATIENT)
Dept: FAMILY MEDICINE CLINIC | Facility: CLINIC | Age: 83
End: 2018-08-28
Payer: MEDICARE

## 2018-08-28 VITALS
HEART RATE: 74 BPM | SYSTOLIC BLOOD PRESSURE: 164 MMHG | WEIGHT: 164.25 LBS | RESPIRATION RATE: 20 BRPM | TEMPERATURE: 96 F | DIASTOLIC BLOOD PRESSURE: 74 MMHG | OXYGEN SATURATION: 98 % | BODY MASS INDEX: 24.89 KG/M2 | HEIGHT: 68 IN

## 2018-08-28 DIAGNOSIS — E87.1 HYPONATREMIA: ICD-10-CM

## 2018-08-28 DIAGNOSIS — I10 BENIGN ESSENTIAL HYPERTENSION: Primary | ICD-10-CM

## 2018-08-28 DIAGNOSIS — K59.04 CHRONIC IDIOPATHIC CONSTIPATION: ICD-10-CM

## 2018-08-28 DIAGNOSIS — J43.1 PANLOBULAR EMPHYSEMA (HCC): ICD-10-CM

## 2018-08-28 PROBLEM — K59.00 CONSTIPATION: Status: RESOLVED | Noted: 2017-12-04 | Resolved: 2018-08-28

## 2018-08-28 PROCEDURE — 99213 OFFICE O/P EST LOW 20 MIN: CPT | Performed by: FAMILY MEDICINE

## 2018-08-28 RX ORDER — BISOPROLOL FUMARATE AND HYDROCHLOROTHIAZIDE 6.25; 5 MG/1; MG/1
1 TABLET ORAL DAILY
Qty: 30 TABLET | Refills: 5 | Status: SHIPPED | OUTPATIENT
Start: 2018-08-28 | End: 2018-08-31

## 2018-08-28 RX ORDER — SODIUM CHLORIDE 1000 MG
1 TABLET, SOLUBLE MISCELLANEOUS DAILY
Qty: 30 TABLET | Refills: 3 | Status: SHIPPED | OUTPATIENT
Start: 2018-08-28 | End: 2020-01-01

## 2018-08-28 NOTE — PROGRESS NOTES
[address]  PROGRESS NOTE  Chief Complaint:   Patient presents with: Follow - Up      HPI:   This is a 80year old male coming in for follow-up. He said he feels just as crummy now as he did before. He does not have a lot of energy. ENDARTERECTOMY Right  No date: CATARACT  No date: CHOLECYSTECTOMY  2011: COLECTOMY  No date: COLONOSCOPY  2011: LAPAROSCOPY, SURGICAL PROSTATECTOMY, RETROPUBI*  2015: PARATHYROIDECTOMY  Social History:  Smoking status: Former Smoker Take 40 mg by mouth every other day. Disp:  Rfl:       Counseling given: Not Answered       REVIEW OF SYSTEMS:   CONSTITUTIONAL: See HPI  EENT:  Eyes:  Denies eye pain, visual loss, blurred vision, double vision or yellow sclerae.  Ears, Nose, Throat:  Leanne Coil patent, no nasal discharge Throat:  No tonsillar erythema or exudate. Mouth:  No oral lesions or ulcerations, good dentition. NECK: Supple, no CLAD, no JVD, no thyromegaly. SKIN: No rashes, no skin lesion, no bruising, good turgor.   HEART:  Regular rate questions, complications, allergies, or worsening or changing symptoms. Patient is to call with any side effects or complications from the treatments as a result of today.      Problem List:  Patient Active Problem List:     Malignant neoplasm of colon ([facility]

## 2018-08-29 ENCOUNTER — TELEPHONE (OUTPATIENT)
Dept: FAMILY MEDICINE CLINIC | Facility: CLINIC | Age: 83
End: 2018-08-29

## 2018-08-29 NOTE — TELEPHONE ENCOUNTER
Patient states he took his first sodium tablet today. States it lodged in his throat because they are so large. Had to remove it with his finger. States he now has read that He can dissolve in water. Throat feels pretty tore up.   States now he is h

## 2018-08-29 NOTE — TELEPHONE ENCOUNTER
He may dissolve the sodium chloride tablets and water. He can crush them up or cut them into small pieces as well. He can take them in any of those forms. Based on what he is saying it does not sound like there is any long-term damage done to his lungs.

## 2018-08-30 ENCOUNTER — TELEPHONE (OUTPATIENT)
Dept: FAMILY MEDICINE CLINIC | Facility: CLINIC | Age: 83
End: 2018-08-30

## 2018-08-30 NOTE — TELEPHONE ENCOUNTER
As long as patient is not dizzy or light headed, recommend to continue the medication. If continues to have concerns, contact Dr. Lena Donaldson tomorrow.

## 2018-08-30 NOTE — TELEPHONE ENCOUNTER
Informed pt of the recommendations. Pt states for the first hour he was lightheaded but wore off. Should pt continue on med. Please advise.

## 2018-08-30 NOTE — TELEPHONE ENCOUNTER
Pt was just started on bisoprolol-hctz today was the first dose. Pt called stating his heart rate when first taken med was 48 but has now jumped up to 55. Pt feels fine but is just worried that his heart rate is so low.     Should pt continue on med,

## 2018-08-31 ENCOUNTER — TELEPHONE (OUTPATIENT)
Dept: FAMILY MEDICINE CLINIC | Facility: CLINIC | Age: 83
End: 2018-08-31

## 2018-08-31 RX ORDER — RAMIPRIL 10 MG/1
10 CAPSULE ORAL DAILY
Refills: 0 | COMMUNITY
Start: 2018-08-31 | End: 2019-02-15

## 2018-08-31 NOTE — TELEPHONE ENCOUNTER
Patient returned call today. States pulse rate yesterday 48-50. Today pulse is still 50. Questions what He should do regarding the Ziac? Please advise.   Jessica Tovar, 08/31/18, 8:48 AM

## 2018-08-31 NOTE — TELEPHONE ENCOUNTER
FYI--Patient has restarted Ramipril.   Entered back into medication list.  Lea Gunn, 08/31/18, 12:48 PM

## 2018-09-05 ENCOUNTER — LABORATORY ENCOUNTER (OUTPATIENT)
Dept: LAB | Age: 83
End: 2018-09-05
Attending: FAMILY MEDICINE
Payer: MEDICARE

## 2018-09-05 ENCOUNTER — TELEPHONE (OUTPATIENT)
Dept: FAMILY MEDICINE CLINIC | Facility: CLINIC | Age: 83
End: 2018-09-05

## 2018-09-05 DIAGNOSIS — S22.000D CLOSED COMPRESSION FRACTURE OF THORACIC VERTEBRA WITH ROUTINE HEALING, SUBSEQUENT ENCOUNTER: ICD-10-CM

## 2018-09-05 DIAGNOSIS — G20 PARKINSON'S DISEASE (HCC): ICD-10-CM

## 2018-09-05 DIAGNOSIS — I10 BENIGN ESSENTIAL HYPERTENSION: ICD-10-CM

## 2018-09-05 LAB
ALBUMIN SERPL-MCNC: 3.9 G/DL (ref 3.5–4.8)
ALBUMIN/GLOB SERPL: 1.2 {RATIO} (ref 1–2)
ALP LIVER SERPL-CCNC: 89 U/L (ref 45–117)
ALT SERPL-CCNC: 8 U/L (ref 17–63)
ANION GAP SERPL CALC-SCNC: 7 MMOL/L (ref 0–18)
AST SERPL-CCNC: 21 U/L (ref 15–41)
BASOPHILS # BLD AUTO: 0.03 X10(3) UL (ref 0–0.1)
BASOPHILS NFR BLD AUTO: 0.6 %
BILIRUB SERPL-MCNC: 0.7 MG/DL (ref 0.1–2)
BUN BLD-MCNC: 11 MG/DL (ref 8–20)
BUN/CREAT SERPL: 13.3 (ref 10–20)
CALCIUM BLD-MCNC: 8.3 MG/DL (ref 8.3–10.3)
CHLORIDE SERPL-SCNC: 98 MMOL/L (ref 101–111)
CO2 SERPL-SCNC: 27 MMOL/L (ref 22–32)
CREAT BLD-MCNC: 0.83 MG/DL (ref 0.7–1.3)
EOSINOPHIL # BLD AUTO: 0.23 X10(3) UL (ref 0–0.3)
EOSINOPHIL NFR BLD AUTO: 4.9 %
ERYTHROCYTE [DISTWIDTH] IN BLOOD BY AUTOMATED COUNT: 14 % (ref 11.5–16)
GLOBULIN PLAS-MCNC: 3.3 G/DL (ref 2.5–4)
GLUCOSE BLD-MCNC: 81 MG/DL (ref 70–99)
HCT VFR BLD AUTO: 38.6 % (ref 37–53)
HGB BLD-MCNC: 12.4 G/DL (ref 13–17)
IMMATURE GRANULOCYTE COUNT: 0 X10(3) UL (ref 0–1)
IMMATURE GRANULOCYTE RATIO %: 0 %
LYMPHOCYTES # BLD AUTO: 1.22 X10(3) UL (ref 0.9–4)
LYMPHOCYTES NFR BLD AUTO: 26 %
M PROTEIN MFR SERPL ELPH: 7.2 G/DL (ref 6.1–8.3)
MCH RBC QN AUTO: 28.2 PG (ref 27–33.2)
MCHC RBC AUTO-ENTMCNC: 32.1 G/DL (ref 31–37)
MCV RBC AUTO: 87.7 FL (ref 80–99)
MONOCYTES # BLD AUTO: 0.55 X10(3) UL (ref 0.1–1)
MONOCYTES NFR BLD AUTO: 11.7 %
NEUTROPHIL ABS PRELIM: 2.66 X10 (3) UL (ref 1.3–6.7)
NEUTROPHILS # BLD AUTO: 2.66 X10(3) UL (ref 1.3–6.7)
NEUTROPHILS NFR BLD AUTO: 56.8 %
OSMOLALITY SERPL CALC.SUM OF ELEC: 272 MOSM/KG (ref 275–295)
PLATELET # BLD AUTO: 202 10(3)UL (ref 150–450)
POTASSIUM SERPL-SCNC: 4.3 MMOL/L (ref 3.6–5.1)
RBC # BLD AUTO: 4.4 X10(6)UL (ref 3.8–5.8)
RED CELL DISTRIBUTION WIDTH-SD: 45.1 FL (ref 35.1–46.3)
SODIUM SERPL-SCNC: 132 MMOL/L (ref 136–144)
WBC # BLD AUTO: 4.7 X10(3) UL (ref 4–13)

## 2018-09-05 PROCEDURE — 80053 COMPREHEN METABOLIC PANEL: CPT

## 2018-09-05 PROCEDURE — 36415 COLL VENOUS BLD VENIPUNCTURE: CPT

## 2018-09-05 PROCEDURE — 85025 COMPLETE CBC W/AUTO DIFF WBC: CPT

## 2018-09-06 NOTE — TELEPHONE ENCOUNTER
----- Message from Belinda Rob MD sent at 9/5/2018  6:14 PM CDT -----  Please call Karthik Lozano. His hemoglobin is stable at 12.4. His sodium level went up slightly to 132.   It is still not normal.  I would like him to continue to take the sodium chloride

## 2018-09-12 ENCOUNTER — OFFICE VISIT (OUTPATIENT)
Dept: FAMILY MEDICINE CLINIC | Facility: CLINIC | Age: 83
End: 2018-09-12
Payer: MEDICARE

## 2018-09-12 VITALS
RESPIRATION RATE: 20 BRPM | TEMPERATURE: 96 F | DIASTOLIC BLOOD PRESSURE: 64 MMHG | HEART RATE: 78 BPM | HEIGHT: 68 IN | WEIGHT: 163.5 LBS | BODY MASS INDEX: 24.78 KG/M2 | SYSTOLIC BLOOD PRESSURE: 138 MMHG

## 2018-09-12 DIAGNOSIS — J43.1 PANLOBULAR EMPHYSEMA (HCC): ICD-10-CM

## 2018-09-12 DIAGNOSIS — E83.52 HYPERCALCEMIA: ICD-10-CM

## 2018-09-12 DIAGNOSIS — E87.1 HYPONATREMIA: ICD-10-CM

## 2018-09-12 DIAGNOSIS — I10 BENIGN ESSENTIAL HYPERTENSION: Primary | ICD-10-CM

## 2018-09-12 DIAGNOSIS — G20 PARKINSON'S DISEASE (HCC): ICD-10-CM

## 2018-09-12 PROCEDURE — 99213 OFFICE O/P EST LOW 20 MIN: CPT | Performed by: FAMILY MEDICINE

## 2018-09-12 NOTE — PROGRESS NOTES
[address]  PROGRESS NOTE  Chief Complaint:   Patient presents with: Follow - Up      HPI:   This is a 80year old male coming in for follow-up.   He has been taking the salt tablets and cutting them into quarters and then dissolving the pg    MCHC 32.1 31.0 - 37.0 g/dL    RDW 14.0 11.5 - 16.0 %    RDW-SD 45.1 35.1 - 46.3 fL    Neutrophil Absolute Prelim 2.66 1.30 - 6.70 x10 (3) uL    Neutrophil Absolute 2.66 1.30 - 6.70 x10(3) uL    Lymphocyte Absolute 1.22 0.90 - 4.00 x10(3) uL    Monocy cramping and diarrhea  Levofloxacin                Comment:Other reaction(s): bothered his legs  Sulfamethoxazole W/*        Comment:Other reaction(s): Unknown             Other reaction(s): hives  Opioid Analgesics           Comment:Other reaction(s): Swe vomiting, constipation, diarrhea, or blood in stool. MUSCULOSKELETAL:  Denies weakness, muscle aches, back pain, joint pain, swelling or stiffness. NEUROLOGICAL: He has good days and bad days with his balance.   He has not had any falls since his last vis change away from ramipril. He has been taking ramipril for a very long time and seems to want to stick with that since he has side effects with any other medicine we changed to.    2. Panlobular emphysema ([address]  He has COPD.   His breathing has been doing Epigastric pain     Current moderate episode of major depressive disorder without prior episode (HCC)     Hyponatremia     Dizziness     Generalized weakness     History of TIA (transient ischemic attack)     Parkinson's disease ([facility]     Acute pain of rig

## 2018-09-19 ENCOUNTER — TELEPHONE (OUTPATIENT)
Dept: FAMILY MEDICINE CLINIC | Facility: CLINIC | Age: 83
End: 2018-09-19

## 2018-10-09 ENCOUNTER — TELEPHONE (OUTPATIENT)
Dept: FAMILY MEDICINE CLINIC | Facility: CLINIC | Age: 83
End: 2018-10-09

## 2018-10-09 DIAGNOSIS — I10 BENIGN ESSENTIAL HYPERTENSION: Primary | ICD-10-CM

## 2018-10-09 DIAGNOSIS — D69.49 PRIMARY THROMBOCYTOPENIA (HCC): ICD-10-CM

## 2018-10-09 DIAGNOSIS — E87.1 HYPONATREMIA: ICD-10-CM

## 2018-10-09 NOTE — TELEPHONE ENCOUNTER
----- Message from Amie Hsieh sent at 10/9/2018 11:36 AM CDT -----  Regarding: lab orders needed   Patient has lab appointment on 10/11/18 could you please put lab orders in system.           Thanks,  Kavita

## 2018-10-11 ENCOUNTER — LABORATORY ENCOUNTER (OUTPATIENT)
Dept: LAB | Age: 83
End: 2018-10-11
Attending: FAMILY MEDICINE
Payer: MEDICARE

## 2018-10-11 DIAGNOSIS — I10 BENIGN ESSENTIAL HYPERTENSION: ICD-10-CM

## 2018-10-11 DIAGNOSIS — D69.49 PRIMARY THROMBOCYTOPENIA (HCC): ICD-10-CM

## 2018-10-11 DIAGNOSIS — E87.1 HYPONATREMIA: ICD-10-CM

## 2018-10-11 PROCEDURE — 85025 COMPLETE CBC W/AUTO DIFF WBC: CPT

## 2018-10-11 PROCEDURE — 36415 COLL VENOUS BLD VENIPUNCTURE: CPT

## 2018-10-11 PROCEDURE — 80053 COMPREHEN METABOLIC PANEL: CPT

## 2018-10-17 ENCOUNTER — OFFICE VISIT (OUTPATIENT)
Dept: FAMILY MEDICINE CLINIC | Facility: CLINIC | Age: 83
End: 2018-10-17
Payer: MEDICARE

## 2018-10-17 VITALS
BODY MASS INDEX: 24.73 KG/M2 | DIASTOLIC BLOOD PRESSURE: 78 MMHG | RESPIRATION RATE: 18 BRPM | HEART RATE: 64 BPM | WEIGHT: 163.19 LBS | SYSTOLIC BLOOD PRESSURE: 174 MMHG | TEMPERATURE: 98 F | HEIGHT: 68 IN

## 2018-10-17 DIAGNOSIS — E87.1 HYPONATREMIA: Primary | ICD-10-CM

## 2018-10-17 DIAGNOSIS — G20 PARKINSON'S DISEASE (HCC): ICD-10-CM

## 2018-10-17 DIAGNOSIS — J43.1 PANLOBULAR EMPHYSEMA (HCC): ICD-10-CM

## 2018-10-17 PROCEDURE — 99213 OFFICE O/P EST LOW 20 MIN: CPT | Performed by: FAMILY MEDICINE

## 2018-10-17 NOTE — PROGRESS NOTES
[address]  PROGRESS NOTE  Chief Complaint:   Patient presents with: Follow - Up      HPI:   This is a 80year old male coming in for follow-up.   He was seen in the emergency room at [facility] on October 6 with shortness of br 14.6 11.5 - 16.0 %    RDW-SD 46.1 35.1 - 46.3 fL    Neutrophil Absolute Prelim 2.02 1.30 - 6.70 x10 (3) uL    Neutrophil Absolute 2.02 1.30 - 6.70 x10(3) uL    Lymphocyte Absolute 0.96 0.90 - 4.00 x10(3) uL    Monocyte Absolute 0.50 0.10 - 1.00 x10(3) uL diarrhea  Levofloxacin                Comment:Other reaction(s): bothered his legs  Sulfamethoxazole W/*        Comment:Other reaction(s): Unknown             Other reaction(s): hives  Opioid Analgesics           Comment:Other reaction(s): Swelling  Penici constipation, diarrhea, or blood in stool. MUSCULOSKELETAL:  Denies weakness, muscle aches, back pain, joint pain, swelling or stiffness. NEUROLOGICAL: He has worsening problems with his balance and his gait.   HEMATOLOGIC:  Denies anemia, bleeding or bru has hyponatremia. He has been taking daily sodium supplements to try and bring his sodium level. His sodium level is now up to 132.    2. Parkinson's disease ([facility] [address] [facility]  He has Parkinson's disease. It is slowly progressive.   He is seeing some definite change Episode of unresponsiveness     MADELEINE (obstructive sleep apnea)     Transient cerebral ischemia     Closed compression fracture of thoracic vertebra [facility])     Fall     Hypoxia      Von Childs MD  10/17/2018  9:49 AM

## 2018-10-24 RX ORDER — CLONAZEPAM 1 MG/1
1 TABLET ORAL NIGHTLY PRN
Qty: 30 TABLET | Refills: 0 | Status: SHIPPED
Start: 2018-10-24 | End: 2018-11-20

## 2018-10-24 NOTE — TELEPHONE ENCOUNTER
Future appt:     Your appointments     Date & Time Appointment Department [facility])    Dec 13, 2018  8:45 AM CST Laboratory Visit with REF [facility] ([facility])    Dec 17, 2018  8:30 AM CST Follow up with Wade Rouse MD

## 2018-11-20 RX ORDER — CLONAZEPAM 1 MG/1
1 TABLET ORAL NIGHTLY PRN
Qty: 30 TABLET | Refills: 5 | Status: SHIPPED
Start: 2018-11-20 | End: 2019-05-14

## 2018-11-20 RX ORDER — OMEPRAZOLE 20 MG/1
CAPSULE, DELAYED RELEASE ORAL
Qty: 90 CAPSULE | Refills: 3 | Status: SHIPPED | OUTPATIENT
Start: 2018-11-20 | End: 2020-01-01

## 2018-11-20 NOTE — TELEPHONE ENCOUNTER
Future appt:     Your appointments     Date & Time Appointment Department [facility])    Dec 13, 2018  8:45 AM CST Laboratory Visit with REF [facility] ([facility])    Dec 17, 2018  8:30 AM CST Follow up with Gilberto Leiva MD

## 2018-12-10 ENCOUNTER — TELEPHONE (OUTPATIENT)
Dept: FAMILY MEDICINE CLINIC | Facility: CLINIC | Age: 83
End: 2018-12-10

## 2018-12-10 DIAGNOSIS — I10 BENIGN ESSENTIAL HYPERTENSION: Primary | ICD-10-CM

## 2018-12-10 DIAGNOSIS — J43.1 PANLOBULAR EMPHYSEMA (HCC): ICD-10-CM

## 2018-12-10 DIAGNOSIS — D86.9 SARCOIDOSIS: ICD-10-CM

## 2018-12-10 DIAGNOSIS — E83.52 HYPERCALCEMIA: ICD-10-CM

## 2018-12-10 DIAGNOSIS — G20 PARKINSON'S DISEASE (HCC): ICD-10-CM

## 2018-12-10 DIAGNOSIS — E87.1 HYPONATREMIA: ICD-10-CM

## 2018-12-10 NOTE — TELEPHONE ENCOUNTER
----- Message from Amelia Dupont sent at 12/10/2018  2:02 PM CST -----  Regarding: lab orders needed   Patient has lab appointment on 12/13/18 could you please put lab orders in system.           Thanks,  Kavita

## 2018-12-13 ENCOUNTER — LABORATORY ENCOUNTER (OUTPATIENT)
Dept: LAB | Age: 83
End: 2018-12-13
Attending: FAMILY MEDICINE
Payer: MEDICARE

## 2018-12-13 DIAGNOSIS — G20 PARKINSON'S DISEASE (HCC): ICD-10-CM

## 2018-12-13 DIAGNOSIS — I10 BENIGN ESSENTIAL HYPERTENSION: ICD-10-CM

## 2018-12-13 DIAGNOSIS — E83.52 HYPERCALCEMIA: ICD-10-CM

## 2018-12-13 DIAGNOSIS — J43.1 PANLOBULAR EMPHYSEMA (HCC): ICD-10-CM

## 2018-12-13 DIAGNOSIS — D86.9 SARCOIDOSIS: ICD-10-CM

## 2018-12-13 DIAGNOSIS — E87.1 HYPONATREMIA: ICD-10-CM

## 2018-12-13 PROCEDURE — 36415 COLL VENOUS BLD VENIPUNCTURE: CPT

## 2018-12-13 PROCEDURE — 80061 LIPID PANEL: CPT

## 2018-12-13 PROCEDURE — 84443 ASSAY THYROID STIM HORMONE: CPT

## 2018-12-13 PROCEDURE — 85025 COMPLETE CBC W/AUTO DIFF WBC: CPT

## 2018-12-13 PROCEDURE — 80053 COMPREHEN METABOLIC PANEL: CPT

## 2018-12-17 ENCOUNTER — OFFICE VISIT (OUTPATIENT)
Dept: FAMILY MEDICINE CLINIC | Facility: CLINIC | Age: 83
End: 2018-12-17
Payer: MEDICARE

## 2018-12-17 VITALS
TEMPERATURE: 97 F | RESPIRATION RATE: 18 BRPM | BODY MASS INDEX: 25.01 KG/M2 | WEIGHT: 165 LBS | SYSTOLIC BLOOD PRESSURE: 144 MMHG | DIASTOLIC BLOOD PRESSURE: 64 MMHG | HEART RATE: 82 BPM | HEIGHT: 68 IN

## 2018-12-17 DIAGNOSIS — G25.81 RESTLESS LEGS SYNDROME: ICD-10-CM

## 2018-12-17 DIAGNOSIS — I10 BENIGN ESSENTIAL HYPERTENSION: ICD-10-CM

## 2018-12-17 DIAGNOSIS — E87.1 HYPONATREMIA: Primary | ICD-10-CM

## 2018-12-17 DIAGNOSIS — G20 PARKINSON'S DISEASE (HCC): ICD-10-CM

## 2018-12-17 PROCEDURE — 99214 OFFICE O/P EST MOD 30 MIN: CPT | Performed by: FAMILY MEDICINE

## 2018-12-17 NOTE — PROGRESS NOTES
[address]  PROGRESS NOTE  Chief Complaint:   Patient presents with: Follow - Up      HPI:   This is a 80year old male coming in for follow-up. He said he saw Dr. Alexander Ibarra. He increased his Sinemet to 6 tablets a day.   He has seen so 4. 45 3.80 - 5.80 x10(6)uL    HGB 12.2 (L) 13.0 - 17.0 g/dL    HCT 39.8 37.0 - 53.0 %    .0 150.0 - 450.0 10(3)uL    MCV 89.4 80.0 - 99.0 fL    MCH 27.4 27.0 - 33.2 pg    MCHC 30.7 (L) 31.0 - 37.0 g/dL    RDW 14.6 11.5 - 16.0 %    RDW-SD 47.8 (H) 35. No    Family History:  No family history on file.   Allergies:    Codeine                     Comment:Other reaction(s): slowed heart rate  Hydrochlorothiazide*        Comment:Other reaction(s): stomach cramping and diarrhea  Levofloxacin                Com Denies chest pain, chest pressure, chest discomfort, palpitations, edema, dyspnea on exertion or at rest.  RESPIRATORY: He is using his nebulizer every day. He said that by doing that he has a more effective cough.   GASTROINTESTINAL:  Denies abdominal slime quadrants, no masses, no hepatosplenomegaly. BACK: No tenderness, no spasm, SLR test negative, FROM. EXTREMITIES:  No edema, no cyanosis, no clubbing, FROM, 2+ dorsalis pedis pulses bilaterally. NEURO: He is very stiff in his gait.   He did climb on the complications, allergies, or worsening or changing symptoms. Patient is to call with any side effects or complications from the treatments as a result of today.      Problem List:  Patient Active Problem List:     Malignant [facility] ([facility]     Nannette Arias

## 2019-02-01 ENCOUNTER — TELEPHONE (OUTPATIENT)
Dept: FAMILY MEDICINE CLINIC | Facility: CLINIC | Age: 84
End: 2019-02-01

## 2019-02-01 RX ORDER — NEBULIZER ACCESSORIES
1 KIT MISCELLANEOUS
Qty: 1 KIT | Refills: 5 | Status: SHIPPED | OUTPATIENT
Start: 2019-02-01

## 2019-02-01 RX ORDER — NEBULIZER ACCESSORIES
1 KIT MISCELLANEOUS
COMMUNITY
End: 2019-02-01

## 2019-02-01 NOTE — TELEPHONE ENCOUNTER
needs RX for container that holds solution for his nebulizer to [place]  and please include OV notes in order for Medicare  to cover the charges.     please advise / call him back

## 2019-02-01 NOTE — TELEPHONE ENCOUNTER
As above. Coy Wolfe, 02/01/19, 1:28 PM    Future appt:     Your appointments     Date & Time Appointment Department [facility])    Mar 13, 2019  8:45 AM CDT Laboratory Visit with REF Dave Singh Reference Lab ([facility] Ref [facility] Nir Began)    Mar 18, 2019 10:00

## 2019-02-14 NOTE — TELEPHONE ENCOUNTER
Future appt:     Your appointments     Date & Time Appointment Department [facility])    Mar 13, 2019  8:45 AM CDT Laboratory Visit with REF Nancy Hives Reference Lab ([facility])    Mar 18, 2019 10:00 AM CDT Medicare Annual Well Visit with Augustin Rollins

## 2019-02-15 RX ORDER — RAMIPRIL 10 MG/1
CAPSULE ORAL
Qty: 90 CAPSULE | Refills: 3 | Status: SHIPPED | OUTPATIENT
Start: 2019-02-15 | End: 2019-10-23

## 2019-03-13 ENCOUNTER — LABORATORY ENCOUNTER (OUTPATIENT)
Dept: LAB | Age: 84
End: 2019-03-13
Attending: FAMILY MEDICINE
Payer: MEDICARE

## 2019-03-13 DIAGNOSIS — G25.81 RESTLESS LEGS SYNDROME: ICD-10-CM

## 2019-03-13 DIAGNOSIS — I10 BENIGN ESSENTIAL HYPERTENSION: ICD-10-CM

## 2019-03-13 DIAGNOSIS — G20 PARKINSON'S DISEASE (HCC): ICD-10-CM

## 2019-03-13 DIAGNOSIS — E87.1 HYPONATREMIA: ICD-10-CM

## 2019-03-13 LAB
ALBUMIN SERPL-MCNC: 3.6 G/DL (ref 3.4–5)
ALBUMIN/GLOB SERPL: 1.2 {RATIO} (ref 1–2)
ALP LIVER SERPL-CCNC: 80 U/L (ref 45–117)
ALT SERPL-CCNC: 10 U/L (ref 16–61)
ANION GAP SERPL CALC-SCNC: 7 MMOL/L (ref 0–18)
AST SERPL-CCNC: 24 U/L (ref 15–37)
BASOPHILS # BLD AUTO: 0.01 X10(3) UL (ref 0–0.2)
BASOPHILS NFR BLD AUTO: 0.2 %
BILIRUB SERPL-MCNC: 0.8 MG/DL (ref 0.1–2)
BUN BLD-MCNC: 12 MG/DL (ref 7–18)
BUN/CREAT SERPL: 12 (ref 10–20)
CALCIUM BLD-MCNC: 8.6 MG/DL (ref 8.5–10.1)
CHLORIDE SERPL-SCNC: 102 MMOL/L (ref 98–107)
CO2 SERPL-SCNC: 29 MMOL/L (ref 21–32)
CREAT BLD-MCNC: 1 MG/DL (ref 0.7–1.3)
DEPRECATED RDW RBC AUTO: 46.9 FL (ref 35.1–46.3)
EOSINOPHIL # BLD AUTO: 0.16 X10(3) UL (ref 0–0.7)
EOSINOPHIL NFR BLD AUTO: 3.4 %
ERYTHROCYTE [DISTWIDTH] IN BLOOD BY AUTOMATED COUNT: 14.6 % (ref 11–15)
GLOBULIN PLAS-MCNC: 3.1 G/DL (ref 2.8–4.4)
GLUCOSE BLD-MCNC: 76 MG/DL (ref 70–99)
HCT VFR BLD AUTO: 37.8 % (ref 39–53)
HGB BLD-MCNC: 11.9 G/DL (ref 13–17.5)
IMM GRANULOCYTES # BLD AUTO: 0.01 X10(3) UL (ref 0–1)
IMM GRANULOCYTES NFR BLD: 0.2 %
LYMPHOCYTES # BLD AUTO: 0.97 X10(3) UL (ref 1–4)
LYMPHOCYTES NFR BLD AUTO: 20.7 %
M PROTEIN MFR SERPL ELPH: 6.7 G/DL (ref 6.4–8.2)
MCH RBC QN AUTO: 27.9 PG (ref 26–34)
MCHC RBC AUTO-ENTMCNC: 31.5 G/DL (ref 31–37)
MCV RBC AUTO: 88.5 FL (ref 80–100)
MONOCYTES # BLD AUTO: 0.48 X10(3) UL (ref 0.1–1)
MONOCYTES NFR BLD AUTO: 10.2 %
NEUTROPHILS # BLD AUTO: 3.06 X10 (3) UL (ref 1.5–7.7)
NEUTROPHILS # BLD AUTO: 3.06 X10(3) UL (ref 1.5–7.7)
NEUTROPHILS NFR BLD AUTO: 65.3 %
OSMOLALITY SERPL CALC.SUM OF ELEC: 285 MOSM/KG (ref 275–295)
PLATELET # BLD AUTO: 227 10(3)UL (ref 150–450)
POTASSIUM SERPL-SCNC: 3.6 MMOL/L (ref 3.5–5.1)
RBC # BLD AUTO: 4.27 X10(6)UL (ref 3.8–5.8)
SODIUM SERPL-SCNC: 138 MMOL/L (ref 136–145)
WBC # BLD AUTO: 4.7 X10(3) UL (ref 4–11)

## 2019-03-13 PROCEDURE — 85025 COMPLETE CBC W/AUTO DIFF WBC: CPT

## 2019-03-13 PROCEDURE — 80053 COMPREHEN METABOLIC PANEL: CPT

## 2019-03-13 PROCEDURE — 36415 COLL VENOUS BLD VENIPUNCTURE: CPT

## 2019-03-18 ENCOUNTER — OFFICE VISIT (OUTPATIENT)
Dept: FAMILY MEDICINE CLINIC | Facility: CLINIC | Age: 84
End: 2019-03-18
Payer: MEDICARE

## 2019-03-18 VITALS
SYSTOLIC BLOOD PRESSURE: 158 MMHG | TEMPERATURE: 96 F | HEART RATE: 80 BPM | DIASTOLIC BLOOD PRESSURE: 70 MMHG | HEIGHT: 68.5 IN | RESPIRATION RATE: 16 BRPM | BODY MASS INDEX: 23.85 KG/M2 | WEIGHT: 159.19 LBS

## 2019-03-18 DIAGNOSIS — D86.9 SARCOIDOSIS: ICD-10-CM

## 2019-03-18 DIAGNOSIS — Z00.00 ENCOUNTER FOR ANNUAL HEALTH EXAMINATION: Primary | ICD-10-CM

## 2019-03-18 DIAGNOSIS — B37.0 THRUSH OF MOUTH AND ESOPHAGUS (HCC): ICD-10-CM

## 2019-03-18 DIAGNOSIS — E87.1 HYPONATREMIA: ICD-10-CM

## 2019-03-18 DIAGNOSIS — C18.9 MALIGNANT NEOPLASM OF COLON, UNSPECIFIED PART OF COLON (HCC): ICD-10-CM

## 2019-03-18 DIAGNOSIS — I10 BENIGN ESSENTIAL HYPERTENSION: ICD-10-CM

## 2019-03-18 DIAGNOSIS — K21.9 GASTROESOPHAGEAL REFLUX DISEASE WITHOUT ESOPHAGITIS: ICD-10-CM

## 2019-03-18 DIAGNOSIS — G25.81 RESTLESS LEGS SYNDROME: ICD-10-CM

## 2019-03-18 DIAGNOSIS — F32.1 CURRENT MODERATE EPISODE OF MAJOR DEPRESSIVE DISORDER WITHOUT PRIOR EPISODE (HCC): ICD-10-CM

## 2019-03-18 DIAGNOSIS — G20 PARKINSON'S DISEASE (HCC): ICD-10-CM

## 2019-03-18 DIAGNOSIS — B37.81 THRUSH OF MOUTH AND ESOPHAGUS (HCC): ICD-10-CM

## 2019-03-18 DIAGNOSIS — J43.1 PANLOBULAR EMPHYSEMA (HCC): ICD-10-CM

## 2019-03-18 DIAGNOSIS — G45.8 OTHER SPECIFIED TRANSIENT CEREBRAL ISCHEMIAS: ICD-10-CM

## 2019-03-18 PROCEDURE — G0439 PPPS, SUBSEQ VISIT: HCPCS | Performed by: FAMILY MEDICINE

## 2019-03-18 PROCEDURE — 99214 OFFICE O/P EST MOD 30 MIN: CPT | Performed by: FAMILY MEDICINE

## 2019-03-18 RX ORDER — BUPROPION HYDROCHLORIDE 75 MG/1
75 TABLET ORAL 2 TIMES DAILY
Qty: 60 TABLET | Refills: 1 | Status: SHIPPED | OUTPATIENT
Start: 2019-03-18 | End: 2019-03-19

## 2019-03-18 RX ORDER — FLUCONAZOLE 100 MG/1
100 TABLET ORAL DAILY
Qty: 10 TABLET | Refills: 0 | Status: SHIPPED | OUTPATIENT
Start: 2019-03-18 | End: 2019-04-23 | Stop reason: ALTCHOICE

## 2019-03-18 NOTE — PATIENT INSTRUCTIONS
Take Diflucan for 10 days, then start Wellbutrin 75 mg daily for 1 week, then increase to 75 mg twice a day. Use over the counter anti-fungal cream for rash on foreskin - use Lotrimin AF. Recommended Websites for Advanced Directives    [email or web address].

## 2019-03-18 NOTE — PROGRESS NOTES
[place] [facility] [place]  PROGRESS NOTE  Chief Complaint:   Patient presents with:  Physical      HPI:   This is a 80year old male coming in for his annual wellness exam.  He said that his thrush is starting to come back.   He is noticing that he has uL    Eosinophil Absolute 0.16 0.00 - 0.70 x10(3) uL    Basophil Absolute 0.01 0.00 - 0.20 x10(3) uL    Immature Granulocyte Absolute 0.01 0.00 - 1.00 x10(3) uL    Neutrophil % 65.3 %    Lymphocyte % 20.7 %    Monocyte % 10.2 %    Eosinophil % 3.4 %    Bas reaction(s): Unknown             Other reaction(s): hives  Opioid Analgesics           Comment:Other reaction(s): Swelling  Penicillin G                Comment:Other reaction(s): swollen tongue  Trimethoprim            HIVES  Current Meds:    Current Outpa skin is been doing well overall. CARDIOVASCULAR:  Denies chest pain, chest pressure, chest discomfort, palpitations, edema, dyspnea on exertion or at rest.  RESPIRATORY:  Denies shortness of breath, wheezing, cough or sputum.   GASTROINTESTINAL: His previo thyromegaly. SKIN: No rashes, no skin lesion, no bruising, good turgor. HEART:  Regular rate and rhythm, no murmurs, rubs or gallops. LUNGS: Clear to auscultation bilterally, no rales/rhonchi/wheezing.   ABDOMEN:  Soft, nondistended, nontender, bowel alisson [facility])  He is noticing increasing depression and is interested in taking a medication for it. Plan: Wellbutrin 75 mg daily for 7 days, then increase to 75 mg twice daily. Recheck in 1 month.  - OFFICE/OUTPT VISIT,EST,LEVL IV    9.  Malignant neoplasm of co global amnesia     Hemispheric carotid artery syndrome     Left carotid artery occlusion     Facial flushing     Syncope     Anxiety     Thrush of mouth and esophagus (HCC)     Epigastric pain     Current moderate episode of major depressive disorder witho

## 2019-03-19 ENCOUNTER — TELEPHONE (OUTPATIENT)
Dept: FAMILY MEDICINE CLINIC | Facility: CLINIC | Age: 84
End: 2019-03-19

## 2019-03-19 RX ORDER — FLUOXETINE 10 MG/1
10 CAPSULE ORAL DAILY
Qty: 90 CAPSULE | Refills: 1 | Status: SHIPPED | OUTPATIENT
Start: 2019-03-19 | End: 2019-09-23

## 2019-03-19 NOTE — TELEPHONE ENCOUNTER
Bupropion is Non Formulary and to costly for Patient. Spoke with [facility] Pharmacist-  Fluoxitine Caps are $2.    Please advise.   Marie Every, 03/19/19, 11:21 AM

## 2019-04-23 NOTE — PROGRESS NOTES
[facility] [place]  PROGRESS NOTE  Chief Complaint:   Patient presents with:  Medication Follow-Up      HPI:   This is a 80year old male coming in for follow-up on his medication.   He was originally given bupropion but was unable to get it danae 26.0 - 34.0 pg    MCHC 31.5 31.0 - 37.0 g/dL    RDW 14.6 11.0 - 15.0 %    RDW-SD 46.9 (H) 35.1 - 46.3 fL    Neutrophil Absolute Prelim 3.06 1.50 - 7.70 x10 (3) uL    Neutrophil Absolute 3.06 1.50 - 7.70 x10(3) uL    Lymphocyte Absolute 0.97 (L) 1.00 - 4.00 HIVES  Codeine                     Comment:Other reaction(s): slowed heart rate  Hydrochlorothiazide*        Comment:Other reaction(s): stomach cramping and diarrhea  Levofloxacin                Comment:Other reaction(s): bothered his legs  Sulfamethoxazo vision or yellow sclerae. Ears, Nose, Throat:  Denies hearing loss, sneezing, congestion, runny nose or sore throat. INTEGUMENTARY:  Denies rashes, itching, skin lesion, or excessive skin dryness.   CARDIOVASCULAR:  Denies chest pain, chest pressure, chest Regular rate and rhythm, no murmurs, rubs or gallops. LUNGS: Clear to auscultation bilterally, no rales/rhonchi/wheezing. ASSESSMENT AND PLAN:   1. Current moderate episode of major depressive disorder without prior episode [facility])  He has depression. Facial flushing     Syncope     Anxiety     Thrush of mouth and esophagus (HCC)     Epigastric pain     Current moderate episode of major depressive disorder without prior episode (HCC)     Hyponatremia     Dizziness     Generalized weakness     History of

## 2019-05-07 RX ORDER — ATORVASTATIN CALCIUM 10 MG/1
TABLET, FILM COATED ORAL
Qty: 90 TABLET | Refills: 1 | Status: SHIPPED | OUTPATIENT
Start: 2019-05-07 | End: 2019-12-04

## 2019-05-07 NOTE — TELEPHONE ENCOUNTER
Future appt:     Your appointments     Date & Time Appointment Department [facility])    Jul 18, 2019  8:45 AM CDT Laboratory Visit with REF Agustín Vergara Reference Lab ([facility])        Jul 24, 2019 11:00 AM CDT Follow up with Jeromy Burden,

## 2019-05-14 RX ORDER — CLONAZEPAM 1 MG/1
1 TABLET ORAL NIGHTLY PRN
Qty: 30 TABLET | Refills: 5 | Status: SHIPPED
Start: 2019-05-14 | End: 2019-11-19

## 2019-05-14 NOTE — TELEPHONE ENCOUNTER
Future appt:     Your appointments     Date & Time Appointment Department [facility])    Jul 18, 2019  8:45 AM CDT Laboratory Visit with REF Adilene Ag Reference Lab ([facility] Ref Lab Mat Goins)        Jul 24, 2019 11:00 AM CDT Follow up with Jonna Aranda,

## 2019-07-18 ENCOUNTER — TELEPHONE (OUTPATIENT)
Dept: FAMILY MEDICINE CLINIC | Facility: CLINIC | Age: 84
End: 2019-07-18

## 2019-07-18 ENCOUNTER — LABORATORY ENCOUNTER (OUTPATIENT)
Dept: LAB | Age: 84
End: 2019-07-18
Attending: FAMILY MEDICINE
Payer: MEDICARE

## 2019-07-18 DIAGNOSIS — J43.1 PANLOBULAR EMPHYSEMA (HCC): Primary | ICD-10-CM

## 2019-07-18 DIAGNOSIS — G20 PARKINSON'S DISEASE (HCC): ICD-10-CM

## 2019-07-18 DIAGNOSIS — F32.1 CURRENT MODERATE EPISODE OF MAJOR DEPRESSIVE DISORDER WITHOUT PRIOR EPISODE (HCC): ICD-10-CM

## 2019-07-18 DIAGNOSIS — E87.1 HYPONATREMIA: ICD-10-CM

## 2019-07-18 LAB
ALBUMIN SERPL-MCNC: 3.8 G/DL (ref 3.4–5)
ALBUMIN/GLOB SERPL: 1.1 {RATIO} (ref 1–2)
ALP LIVER SERPL-CCNC: 83 U/L (ref 45–117)
ALT SERPL-CCNC: 10 U/L (ref 16–61)
ANION GAP SERPL CALC-SCNC: 6 MMOL/L (ref 0–18)
AST SERPL-CCNC: 18 U/L (ref 15–37)
BASOPHILS # BLD AUTO: 0.04 X10(3) UL (ref 0–0.2)
BASOPHILS NFR BLD AUTO: 0.9 %
BILIRUB SERPL-MCNC: 0.7 MG/DL (ref 0.1–2)
BUN BLD-MCNC: 14 MG/DL (ref 7–18)
BUN/CREAT SERPL: 14.4 (ref 10–20)
CALCIUM BLD-MCNC: 8.8 MG/DL (ref 8.5–10.1)
CHLORIDE SERPL-SCNC: 99 MMOL/L (ref 98–112)
CO2 SERPL-SCNC: 29 MMOL/L (ref 21–32)
CREAT BLD-MCNC: 0.97 MG/DL (ref 0.7–1.3)
DEPRECATED RDW RBC AUTO: 48.1 FL (ref 35.1–46.3)
EOSINOPHIL # BLD AUTO: 0.2 X10(3) UL (ref 0–0.7)
EOSINOPHIL NFR BLD AUTO: 4.5 %
ERYTHROCYTE [DISTWIDTH] IN BLOOD BY AUTOMATED COUNT: 15.9 % (ref 11–15)
GLOBULIN PLAS-MCNC: 3.5 G/DL (ref 2.8–4.4)
GLUCOSE BLD-MCNC: 78 MG/DL (ref 70–99)
HCT VFR BLD AUTO: 39.9 % (ref 39–53)
HGB BLD-MCNC: 12.2 G/DL (ref 13–17.5)
IMM GRANULOCYTES # BLD AUTO: 0.01 X10(3) UL (ref 0–1)
IMM GRANULOCYTES NFR BLD: 0.2 %
LYMPHOCYTES # BLD AUTO: 0.97 X10(3) UL (ref 1–4)
LYMPHOCYTES NFR BLD AUTO: 21.7 %
M PROTEIN MFR SERPL ELPH: 7.3 G/DL (ref 6.4–8.2)
MCH RBC QN AUTO: 25.8 PG (ref 26–34)
MCHC RBC AUTO-ENTMCNC: 30.6 G/DL (ref 31–37)
MCV RBC AUTO: 84.4 FL (ref 80–100)
MONOCYTES # BLD AUTO: 0.6 X10(3) UL (ref 0.1–1)
MONOCYTES NFR BLD AUTO: 13.4 %
NEUTROPHILS # BLD AUTO: 2.66 X10 (3) UL (ref 1.5–7.7)
NEUTROPHILS # BLD AUTO: 2.66 X10(3) UL (ref 1.5–7.7)
NEUTROPHILS NFR BLD AUTO: 59.3 %
OSMOLALITY SERPL CALC.SUM OF ELEC: 277 MOSM/KG (ref 275–295)
PLATELET # BLD AUTO: 204 10(3)UL (ref 150–450)
POTASSIUM SERPL-SCNC: 4.2 MMOL/L (ref 3.5–5.1)
RBC # BLD AUTO: 4.73 X10(6)UL (ref 3.8–5.8)
SODIUM SERPL-SCNC: 134 MMOL/L (ref 136–145)
WBC # BLD AUTO: 4.5 X10(3) UL (ref 4–11)

## 2019-07-18 PROCEDURE — 85025 COMPLETE CBC W/AUTO DIFF WBC: CPT

## 2019-07-18 PROCEDURE — 80053 COMPREHEN METABOLIC PANEL: CPT

## 2019-07-18 PROCEDURE — 36415 COLL VENOUS BLD VENIPUNCTURE: CPT

## 2019-07-18 RX ORDER — IPRATROPIUM BROMIDE AND ALBUTEROL SULFATE 2.5; .5 MG/3ML; MG/3ML
3 SOLUTION RESPIRATORY (INHALATION) 2 TIMES DAILY PRN
Qty: 1 CONTAINER | Refills: 0 | Status: SHIPPED | OUTPATIENT
Start: 2019-07-18 | End: 2020-01-01

## 2019-07-18 NOTE — TELEPHONE ENCOUNTER
nebulizer solution from dr Baron Still, cant reach his office, Ipratrop albut solution 0.5/2.5 uses 1 viel twice a day as needed, dr Ari Monge to give a refill til dr Baron Still is back

## 2019-07-24 ENCOUNTER — OFFICE VISIT (OUTPATIENT)
Dept: FAMILY MEDICINE CLINIC | Facility: CLINIC | Age: 84
End: 2019-07-24
Payer: MEDICARE

## 2019-07-24 VITALS
TEMPERATURE: 97 F | RESPIRATION RATE: 18 BRPM | SYSTOLIC BLOOD PRESSURE: 120 MMHG | DIASTOLIC BLOOD PRESSURE: 80 MMHG | HEIGHT: 68.5 IN | BODY MASS INDEX: 23.82 KG/M2 | OXYGEN SATURATION: 91 % | WEIGHT: 159 LBS | HEART RATE: 83 BPM

## 2019-07-24 DIAGNOSIS — I10 BENIGN ESSENTIAL HYPERTENSION: ICD-10-CM

## 2019-07-24 DIAGNOSIS — K59.1 FUNCTIONAL DIARRHEA: ICD-10-CM

## 2019-07-24 DIAGNOSIS — J43.1 PANLOBULAR EMPHYSEMA (HCC): ICD-10-CM

## 2019-07-24 DIAGNOSIS — G25.81 RESTLESS LEGS SYNDROME: ICD-10-CM

## 2019-07-24 DIAGNOSIS — N40.1 BENIGN PROSTATIC HYPERPLASIA WITH INCOMPLETE BLADDER EMPTYING: Primary | ICD-10-CM

## 2019-07-24 DIAGNOSIS — G20 PARKINSON'S DISEASE (HCC): ICD-10-CM

## 2019-07-24 DIAGNOSIS — E87.1 HYPONATREMIA: ICD-10-CM

## 2019-07-24 DIAGNOSIS — R39.14 BENIGN PROSTATIC HYPERPLASIA WITH INCOMPLETE BLADDER EMPTYING: Primary | ICD-10-CM

## 2019-07-24 PROCEDURE — 99214 OFFICE O/P EST MOD 30 MIN: CPT | Performed by: FAMILY MEDICINE

## 2019-07-24 RX ORDER — TAMSULOSIN HYDROCHLORIDE 0.4 MG/1
0.4 CAPSULE ORAL DAILY
Qty: 90 CAPSULE | Refills: 1 | Status: SHIPPED | OUTPATIENT
Start: 2019-07-24 | End: 2020-01-01

## 2019-07-24 NOTE — PROGRESS NOTES
[address] [place] [address]  PROGRESS NOTE  Chief Complaint:   Patient presents with: Follow - Up      HPI:   This is a 80year old male coming in for follow-up. He saw his neurologist, Dr. Brian Ang, in June.   He increased the dose of Sinemet to 2 tabl Granulocyte Absolute 0.01 0.00 - 1.00 x10(3) uL    Neutrophil % 59.3 %    Lymphocyte % 21.7 %    Monocyte % 13.4 %    Eosinophil % 4.5 %    Basophil % 0.9 %    Immature Granulocyte % 0.2 %       Past Medical History:   Diagnosis Date   • Anxiety    • Blanka Truong Swelling  Penicillin G                Comment:Other reaction(s): swollen tongue  Trimethoprim            HIVES  Current Meds:    Current Outpatient Medications:  tamsulosin HCl 0.4 MG Oral Cap Take 1 capsule (0.4 mg total) by mouth daily.  Disp: 90 capsule or at rest.  RESPIRATORY:  Denies shortness of breath, wheezing, cough or sputum. GASTROINTESTINAL: He did have some loose stools recently. He said the stools are foul-smelling.   MUSCULOSKELETAL:  Denies weakness, muscle aches, back pain, joint pain, swe quadrants, no masses, no hepatosplenomegaly. BACK: No tenderness, no spasm, SLR test negative, FROM. EXTREMITIES:  No edema, no cyanosis, no clubbing, FROM, 2+ dorsalis pedis pulses bilaterally. NEURO: He does not have any focal deficit.   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

## 2019-09-23 RX ORDER — FLUOXETINE 10 MG/1
CAPSULE ORAL
Qty: 90 CAPSULE | Refills: 1 | Status: SHIPPED | OUTPATIENT
Start: 2019-09-23 | End: 2019-10-23

## 2019-09-23 NOTE — TELEPHONE ENCOUNTER
Future appt:     Your appointments     Date & Time Appointment Department [facility])    Oct 21, 2019  8:15 AM CDT Laboratory Visit with REF Elvin Haynes Reference Lab ([facility] Ref Lab Teo Paulino)        Oct 23, 2019  9:00 AM CDT Follow up with Minnie Munoz,

## 2019-10-21 ENCOUNTER — LABORATORY ENCOUNTER (OUTPATIENT)
Dept: LAB | Age: 84
End: 2019-10-21
Attending: FAMILY MEDICINE
Payer: MEDICARE

## 2019-10-21 DIAGNOSIS — G20 PARKINSON'S DISEASE (HCC): ICD-10-CM

## 2019-10-21 DIAGNOSIS — G25.81 RESTLESS LEGS SYNDROME: ICD-10-CM

## 2019-10-21 PROCEDURE — 85025 COMPLETE CBC W/AUTO DIFF WBC: CPT

## 2019-10-21 PROCEDURE — 80053 COMPREHEN METABOLIC PANEL: CPT

## 2019-10-21 PROCEDURE — 36415 COLL VENOUS BLD VENIPUNCTURE: CPT

## 2019-10-23 ENCOUNTER — OFFICE VISIT (OUTPATIENT)
Dept: FAMILY MEDICINE CLINIC | Facility: CLINIC | Age: 84
End: 2019-10-23
Payer: MEDICARE

## 2019-10-23 VITALS
WEIGHT: 159.38 LBS | BODY MASS INDEX: 23.88 KG/M2 | TEMPERATURE: 96 F | OXYGEN SATURATION: 90 % | HEART RATE: 59 BPM | HEIGHT: 68.5 IN | SYSTOLIC BLOOD PRESSURE: 134 MMHG | DIASTOLIC BLOOD PRESSURE: 70 MMHG | RESPIRATION RATE: 20 BRPM

## 2019-10-23 DIAGNOSIS — R39.14 BENIGN PROSTATIC HYPERPLASIA WITH INCOMPLETE BLADDER EMPTYING: ICD-10-CM

## 2019-10-23 DIAGNOSIS — G20 PARKINSON'S DISEASE (HCC): ICD-10-CM

## 2019-10-23 DIAGNOSIS — I10 BENIGN ESSENTIAL HYPERTENSION: Primary | ICD-10-CM

## 2019-10-23 DIAGNOSIS — N40.1 BENIGN PROSTATIC HYPERPLASIA WITH INCOMPLETE BLADDER EMPTYING: ICD-10-CM

## 2019-10-23 DIAGNOSIS — Z23 NEED FOR VACCINATION: ICD-10-CM

## 2019-10-23 DIAGNOSIS — G25.81 RESTLESS LEGS SYNDROME: ICD-10-CM

## 2019-10-23 PROCEDURE — G0008 ADMIN INFLUENZA VIRUS VAC: HCPCS | Performed by: FAMILY MEDICINE

## 2019-10-23 PROCEDURE — 99214 OFFICE O/P EST MOD 30 MIN: CPT | Performed by: FAMILY MEDICINE

## 2019-10-23 PROCEDURE — 90662 IIV NO PRSV INCREASED AG IM: CPT | Performed by: FAMILY MEDICINE

## 2019-10-23 NOTE — PROGRESS NOTES
[address]  PROGRESS NOTE  Chief Complaint:   Patient presents with: Follow - Up      HPI:   This is a 80year old male coming in for follow-up.   He said that he has not been taking the ramipril because his blood pressure is running ana 37.0 g/dL    RDW 16.4 (H) 11.0 - 15.0 %    RDW-SD 50.7 (H) 35.1 - 46.3 fL    Neutrophil Absolute Prelim 2.62 1.50 - 7.70 x10 (3) uL    Neutrophil Absolute 2.62 1.50 - 7.70 x10(3) uL    Lymphocyte Absolute 1.00 1.00 - 4.00 x10(3) uL    Monocyte Absolute 0.4 HIVES  Codeine                     Comment:Other reaction(s): slowed heart rate  Hydrochlorothiazide*        Comment:Other reaction(s): stomach cramping and diarrhea  Levofloxacin                Comment:Other reaction(s): bothered his legs  Sulfamethoxazol his left ear. INTEGUMENTARY:  Denies rashes, itching, skin lesion, or excessive skin dryness.   CARDIOVASCULAR:  Denies chest pain, chest pressure, chest discomfort, palpitations, edema, dyspnea on exertion or at rest.  RESPIRATORY:  Denies shortness of br turgor. HEART:  Regular rate and rhythm, no murmurs, rubs or gallops. LUNGS: Clear to auscultation bilterally, no rales/rhonchi/wheezing. ABDOMEN:  Soft, nondistended, nontender, bowel sounds normal in all 4 quadrants, no masses, no hepatosplenomegaly. occlusion of artery of extremity (HCC)     COPD (chronic obstructive pulmonary disease) (HCC)     Eczema     Esophageal reflux     Hypercalcemia     Benign essential hypertension     Restless legs syndrome     Primary thrombocytopenia (HCC)     Sarcoidosis

## 2019-11-02 ENCOUNTER — TELEPHONE (OUTPATIENT)
Dept: FAMILY MEDICINE CLINIC | Facility: CLINIC | Age: 84
End: 2019-11-02

## 2019-11-05 ENCOUNTER — TELEPHONE (OUTPATIENT)
Dept: FAMILY MEDICINE CLINIC | Facility: CLINIC | Age: 84
End: 2019-11-05

## 2019-11-05 NOTE — TELEPHONE ENCOUNTER
Appt for Post [facility] Discharge given with  . Rosa Maria Hargrove, 11/05/19, 11:41 AM    Future appt:     Your appointments     Date & Time Appointment Department [facility])    Nov 08, 2019  1:00 PM CST Exam - Established with MD Radha Chong

## 2019-11-05 NOTE — TELEPHONE ENCOUNTER
Just got out of hospital, needs to see doctor within three days, nothing available, please give a call back

## 2019-11-08 ENCOUNTER — OFFICE VISIT (OUTPATIENT)
Dept: FAMILY MEDICINE CLINIC | Facility: CLINIC | Age: 84
End: 2019-11-08
Payer: MEDICARE

## 2019-11-08 VITALS
WEIGHT: 158.19 LBS | TEMPERATURE: 98 F | HEIGHT: 68.5 IN | RESPIRATION RATE: 18 BRPM | DIASTOLIC BLOOD PRESSURE: 60 MMHG | BODY MASS INDEX: 23.7 KG/M2 | HEART RATE: 60 BPM | SYSTOLIC BLOOD PRESSURE: 136 MMHG | OXYGEN SATURATION: 99 %

## 2019-11-08 DIAGNOSIS — J43.1 PANLOBULAR EMPHYSEMA (HCC): ICD-10-CM

## 2019-11-08 DIAGNOSIS — I10 ESSENTIAL HYPERTENSION: ICD-10-CM

## 2019-11-08 DIAGNOSIS — I50.41 ACUTE COMBINED SYSTOLIC AND DIASTOLIC HEART FAILURE (HCC): Primary | ICD-10-CM

## 2019-11-08 DIAGNOSIS — R09.02 HYPOXIA: ICD-10-CM

## 2019-11-08 DIAGNOSIS — I50.32 CHRONIC DIASTOLIC HEART FAILURE (HCC): ICD-10-CM

## 2019-11-08 PROBLEM — I25.10 CORONARY ARTERY DISEASE INVOLVING NATIVE CORONARY ARTERY OF NATIVE HEART: Status: ACTIVE | Noted: 2019-11-02

## 2019-11-08 PROCEDURE — 99214 OFFICE O/P EST MOD 30 MIN: CPT | Performed by: FAMILY MEDICINE

## 2019-11-08 PROCEDURE — 1111F DSCHRG MED/CURRENT MED MERGE: CPT | Performed by: FAMILY MEDICINE

## 2019-11-08 RX ORDER — FLUOXETINE 10 MG/1
10 CAPSULE ORAL DAILY
COMMUNITY
End: 2020-01-01

## 2019-11-08 RX ORDER — FUROSEMIDE 20 MG/1
20 TABLET ORAL 2 TIMES DAILY
Refills: 0 | COMMUNITY
Start: 2019-11-05 | End: 2019-12-16

## 2019-11-08 RX ORDER — LISINOPRIL 5 MG/1
5 TABLET ORAL
Refills: 0 | COMMUNITY
Start: 2019-11-04 | End: 2019-11-08

## 2019-11-08 RX ORDER — CARVEDILOL 3.12 MG/1
TABLET ORAL
Refills: 0 | COMMUNITY
Start: 2019-11-04 | End: 2019-11-08

## 2019-11-08 RX ORDER — RAMIPRIL 5 MG/1
5 CAPSULE ORAL DAILY
Qty: 90 CAPSULE | Refills: 1 | Status: SHIPPED | OUTPATIENT
Start: 2019-11-08 | End: 2019-12-20

## 2019-11-08 RX ORDER — DOXYCYCLINE 100 MG/1
CAPSULE ORAL
Refills: 0 | COMMUNITY
Start: 2019-11-04 | End: 2019-11-23 | Stop reason: ALTCHOICE

## 2019-11-08 NOTE — PATIENT INSTRUCTIONS
Stop Carvedilol. Stop Lisinopril. Take Ramipril 5 mg daily. Use oxygen when needed. Take Furosemide 20 mg daily. Recheck in 2 weeks.

## 2019-11-08 NOTE — PROGRESS NOTES
[facility]  [facility]  Chief Complaint:   Patient presents with:  Hospital F/U      HPI:   This is a 80year old male coming in for follow-up of his hospitalization.   He was admitted to [facility] November 2 with acute short 100.0 fL    MCH 26.3 26.0 - 34.0 pg    MCHC 31.1 31.0 - 37.0 g/dL    RDW 16.4 (H) 11.0 - 15.0 %    RDW-SD 50.7 (H) 35.1 - 46.3 fL    Neutrophil Absolute Prelim 2.62 1.50 - 7.70 x10 (3) uL    Neutrophil Absolute 2.62 1.50 - 7.70 x10(3) uL    Lymphocyte Abso Comment:hypertension  Morphine                OTHER (SEE COMMENTS)    Comment:Other reaction(s): Swelling  Sulfa Antibiotics       HIVES  Codeine                     Comment:Other reaction(s): slowed heart rate  Hydrochlorothiazide*        Comment:Other re • rOPINIRole HCl 1 MG Oral Tab Take 1 tablet by mouth 2 (two) times daily. 3      Counseling given: Not Answered       REVIEW OF SYSTEMS:   CONSTITUTIONAL: He is starting to feel better. He said that his shortness of breath is improved.   He is not ha nourished, no apparent distress. He has oxygen on now from a portable tank. He was able to climb on the exam table with no assistance.   HEENT:  Head:  Normocephalic, atraumatic Eyes: EOMI, PERRLA, no scleral icterus, conjunctivae clear bilaterally, no ey hypoxia at the time of his admission. That has cleared now. Plan: Continue oxygen at home. Recheck in 2 weeks.     Meds & Refills for this Visit:  Requested Prescriptions     Signed Prescriptions Disp Refills   • ramipril 5 MG Oral Cap 90 capsule 1 involving native coronary artery of native heart     Chronic diastolic heart failure ([facility]     Acute combined systolic and diastolic heart failure ([facility]      Kamala Foster MD  11/8/2019  1:50 PM

## 2019-11-09 ENCOUNTER — TELEPHONE (OUTPATIENT)
Dept: FAMILY MEDICINE CLINIC | Facility: CLINIC | Age: 84
End: 2019-11-09

## 2019-11-09 NOTE — TELEPHONE ENCOUNTER
Mir calling to confirm medications patient is to take following yesterdays appt. Informed stop Lisinopril, start Ramipril. Continue Furosemide 20mg daily. Stop Carvedilol/agreed.     Kirill Arce, 11/09/19, 11:35 AM

## 2019-11-09 NOTE — TELEPHONE ENCOUNTER
pt was admitted to home care today - [facility] has question about his medications

## 2019-11-12 ENCOUNTER — TELEPHONE (OUTPATIENT)
Dept: FAMILY MEDICINE CLINIC | Facility: CLINIC | Age: 84
End: 2019-11-12

## 2019-11-12 NOTE — TELEPHONE ENCOUNTER
EDD:  Completed his PT eval., we would like to continue PT one more time this week, 2X's a week for 3more wks.

## 2019-11-15 ENCOUNTER — TELEPHONE (OUTPATIENT)
Dept: FAMILY MEDICINE CLINIC | Facility: CLINIC | Age: 84
End: 2019-11-15

## 2019-11-20 RX ORDER — CLONAZEPAM 1 MG/1
TABLET ORAL
Qty: 30 TABLET | Refills: 5 | Status: SHIPPED | OUTPATIENT
Start: 2019-11-20 | End: 2020-01-01

## 2019-11-20 NOTE — TELEPHONE ENCOUNTER
Future appt:     Your appointments     Date & Time Appointment Department [facility])    Nov 23, 2019 10:30 AM CST Follow Up Visit with Landry Black MD [facility], [facility] ([facility])        Emanuel 15, 2020  8:45

## 2019-11-23 ENCOUNTER — OFFICE VISIT (OUTPATIENT)
Dept: FAMILY MEDICINE CLINIC | Facility: CLINIC | Age: 84
End: 2019-11-23
Payer: MEDICARE

## 2019-11-23 VITALS
DIASTOLIC BLOOD PRESSURE: 74 MMHG | BODY MASS INDEX: 24.07 KG/M2 | SYSTOLIC BLOOD PRESSURE: 158 MMHG | TEMPERATURE: 97 F | WEIGHT: 158.81 LBS | OXYGEN SATURATION: 93 % | RESPIRATION RATE: 20 BRPM | HEIGHT: 68 IN | HEART RATE: 64 BPM

## 2019-11-23 DIAGNOSIS — G20 PARKINSON'S DISEASE (HCC): ICD-10-CM

## 2019-11-23 DIAGNOSIS — I50.41 ACUTE COMBINED SYSTOLIC AND DIASTOLIC HEART FAILURE (HCC): Primary | ICD-10-CM

## 2019-11-23 DIAGNOSIS — I25.10 CORONARY ARTERY DISEASE INVOLVING NATIVE CORONARY ARTERY OF NATIVE HEART WITHOUT ANGINA PECTORIS: ICD-10-CM

## 2019-11-23 DIAGNOSIS — I50.32 CHRONIC DIASTOLIC HEART FAILURE (HCC): ICD-10-CM

## 2019-11-23 DIAGNOSIS — I10 ESSENTIAL HYPERTENSION: ICD-10-CM

## 2019-11-23 PROCEDURE — 99214 OFFICE O/P EST MOD 30 MIN: CPT | Performed by: FAMILY MEDICINE

## 2019-11-23 NOTE — PROGRESS NOTES
[address]  PROGRESS NOTE  Chief Complaint:   Patient presents with: Follow - Up      HPI:   This is a 80year old male coming in for follow-up. He has been feeling pretty good overall.   He said that his breathing has been good and he uL    Basophil Absolute 0.02 0.00 - 0.20 x10(3) uL    Immature Granulocyte Absolute 0.01 0.00 - 1.00 x10(3) uL    Neutrophil % 61.4 %    Lymphocyte % 23.4 %    Monocyte % 11.2 %    Eosinophil % 3.3 %    Basophil % 0.5 %    Immature Granulocyte % 0.2 % W/*        Comment:Other reaction(s): Unknown             Other reaction(s): hives  Opioid Analgesics           Comment:Other reaction(s): Swelling  Penicillin G                Comment:Other reaction(s): swollen tongue  Trimethoprim            HIVES  Curre dryness. CARDIOVASCULAR:  Denies chest pain, chest pressure, chest discomfort, palpitations, edema, dyspnea on exertion or at rest.  RESPIRATORY: He is not having any wheezing or coughing now. GASTROINTESTINAL: His appetite is mildly diminished.   He does rales/rhonchi/wheezing. ABDOMEN:  Soft, nondistended, nontender, bowel sounds normal in all 4 quadrants, no masses, no hepatosplenomegaly. EXTREMITIES:  No edema, no cyanosis, no clubbing, FROM, 2+ dorsalis pedis pulses bilaterally.   NEURO: No focal defi Primary thrombocytopenia (HCC)     Sarcoidosis     Basal cell carcinoma     Transient global amnesia     Hemispheric carotid artery syndrome     Left carotid artery occlusion     Facial flushing     Syncope     Anxiety     Thrush of mouth and esophagus (HC

## 2019-11-26 ENCOUNTER — TELEPHONE (OUTPATIENT)
Dept: FAMILY MEDICINE CLINIC | Facility: CLINIC | Age: 84
End: 2019-11-26

## 2019-11-26 NOTE — TELEPHONE ENCOUNTER
orders for PT    wants to add medical / social work   for [employer] in the home   - will fax over [address] Fagan     no call back necessary

## 2019-12-04 ENCOUNTER — TELEPHONE (OUTPATIENT)
Dept: FAMILY MEDICINE CLINIC | Facility: CLINIC | Age: 84
End: 2019-12-04

## 2019-12-04 NOTE — TELEPHONE ENCOUNTER
aKylan Ashbyapril states she has applied for Elder care for this pt. She believes he will get the assistance. Pt is not ready for [facility] yet.

## 2019-12-05 ENCOUNTER — TELEPHONE (OUTPATIENT)
Dept: FAMILY MEDICINE CLINIC | Facility: CLINIC | Age: 84
End: 2019-12-05

## 2019-12-05 RX ORDER — ATORVASTATIN CALCIUM 10 MG/1
TABLET, FILM COATED ORAL
Qty: 90 TABLET | Refills: 1 | Status: SHIPPED | OUTPATIENT
Start: 2019-12-05 | End: 2020-01-01

## 2019-12-05 NOTE — TELEPHONE ENCOUNTER
Spoke with Xochitl Walker with [facility]. States she is at patient's house right now and patient is having some SOB with decreased O2.   States patient is using 2.5L with walking and his O2 decreased to 71% but his hands are cold so it might not be registerin

## 2019-12-05 NOTE — TELEPHONE ENCOUNTER
Future appt:     Your appointments     Date & Time Appointment Department [facility])    Emanuel 15, 2020  8:45 AM CST Laboratory Visit with REF Jeremy Gates Reference Lab ([facility])        Jan 22, 2020  9:30 AM CST Follow Up Visit with Karen Sena

## 2019-12-10 ENCOUNTER — TELEPHONE (OUTPATIENT)
Dept: FAMILY MEDICINE CLINIC | Facility: CLINIC | Age: 84
End: 2019-12-10

## 2019-12-10 NOTE — TELEPHONE ENCOUNTER
Per [address] PT. Patient's O2% 85 at 2 liters. Put O2 to 2.5 liters. O2% went to 92% then back down to 88%. States patient have a productive cough and is not feeling well. Patient requesting to Jenna Arellano to be taken to [facility] ER by Ambulance.   [facility]

## 2019-12-12 ENCOUNTER — TELEPHONE (OUTPATIENT)
Dept: FAMILY MEDICINE CLINIC | Facility: CLINIC | Age: 84
End: 2019-12-12

## 2019-12-12 NOTE — TELEPHONE ENCOUNTER
FYI: Pt was seen today and is stable and wants to know if Dr is aware of the medication change that pt had at the hospital.

## 2019-12-13 NOTE — TELEPHONE ENCOUNTER
Yes.  Ni Quigley was seen in the emergency department at [facility] on December 10. His furosemide was increased to 20 mg twice daily.

## 2019-12-13 NOTE — TELEPHONE ENCOUNTER
Josafat Lux, Physical Therapist with [facility], informed of the below recommendations.

## 2019-12-16 ENCOUNTER — OFFICE VISIT (OUTPATIENT)
Dept: FAMILY MEDICINE CLINIC | Facility: CLINIC | Age: 84
End: 2019-12-16
Payer: MEDICARE

## 2019-12-16 VITALS
OXYGEN SATURATION: 91 % | DIASTOLIC BLOOD PRESSURE: 60 MMHG | WEIGHT: 157.38 LBS | TEMPERATURE: 97 F | RESPIRATION RATE: 16 BRPM | BODY MASS INDEX: 23.85 KG/M2 | HEIGHT: 68 IN | HEART RATE: 76 BPM | SYSTOLIC BLOOD PRESSURE: 132 MMHG

## 2019-12-16 DIAGNOSIS — J43.1 PANLOBULAR EMPHYSEMA (HCC): ICD-10-CM

## 2019-12-16 DIAGNOSIS — I70.92 CHRONIC TOTAL OCCLUSION OF ARTERY OF EXTREMITY (HCC): ICD-10-CM

## 2019-12-16 DIAGNOSIS — I10 ESSENTIAL HYPERTENSION: ICD-10-CM

## 2019-12-16 DIAGNOSIS — I25.10 CORONARY ARTERY DISEASE INVOLVING NATIVE CORONARY ARTERY OF NATIVE HEART WITHOUT ANGINA PECTORIS: ICD-10-CM

## 2019-12-16 DIAGNOSIS — I50.41 ACUTE COMBINED SYSTOLIC AND DIASTOLIC HEART FAILURE (HCC): Primary | ICD-10-CM

## 2019-12-16 DIAGNOSIS — D69.49 PRIMARY THROMBOCYTOPENIA (HCC): ICD-10-CM

## 2019-12-16 PROCEDURE — 1111F DSCHRG MED/CURRENT MED MERGE: CPT | Performed by: FAMILY MEDICINE

## 2019-12-16 PROCEDURE — 99214 OFFICE O/P EST MOD 30 MIN: CPT | Performed by: FAMILY MEDICINE

## 2019-12-16 RX ORDER — FUROSEMIDE 20 MG/1
20 TABLET ORAL 2 TIMES DAILY
Qty: 60 TABLET | Refills: 5 | Status: SHIPPED | OUTPATIENT
Start: 2019-12-16 | End: 2019-12-20

## 2019-12-16 NOTE — PROGRESS NOTES
[address]  PROGRESS NOTE  Chief Complaint:   Patient presents with:  Hospital F/U: Furosemide Increased/New Rx to [facility]. Not on Potassium      HPI:   This is a 80year old male coming in for hospital follow-up.   He said that he became WBC 4.3 4.0 - 11.0 x10(3) uL    RBC 4.48 3.80 - 5.80 x10(6)uL    HGB 11.8 (L) 13.0 - 17.5 g/dL    HCT 38.0 (L) 39.0 - 53.0 %    .0 150.0 - 450.0 10(3)uL    MCV 84.8 80.0 - 100.0 fL    MCH 26.3 26.0 - 34.0 pg    MCHC 31.1 31.0 - 37.0 g/dL    RDW 16. 4 Alcohol/week: 0.0 standard drinks    Drug use: No    Family History:  History reviewed. No pertinent family history.   Allergies:    Gabapentin              OTHER (SEE COMMENTS)    Comment:hypertension  Morphine                OTHER (SEE COMMENTS)    Commen tablets by mouth 4 (four) times daily. • aspirin  MG Oral Tab EC Take 325 mg by mouth daily. • rOPINIRole HCl 1 MG Oral Tab Take 1 tablet by mouth 2 (two) times daily.     3      Counseling given: Not Answered       REVIEW OF SYSTEMS:   CONST is alert and smiling. He does not appear ill now. He is not dyspneic at rest.  He is wearing oxygen from a portable tank.   HEENT:  Head:  Normocephalic, atraumatic Eyes: EOMI, PERRLA, no scleral icterus, conjunctivae clear bilaterally, no eye discharge E on 12/13/2019    Patient/Caregiver Education: Patient/Caregiver Education: There are no barriers to learning. Medical education done. Outcome: Patient verbalizes understanding.  Patient is notified to call with any questions, complications, allergies, or

## 2019-12-20 ENCOUNTER — TELEPHONE (OUTPATIENT)
Dept: FAMILY MEDICINE CLINIC | Facility: CLINIC | Age: 84
End: 2019-12-20

## 2019-12-20 RX ORDER — FUROSEMIDE 20 MG/1
20 TABLET ORAL 2 TIMES DAILY
Qty: 60 TABLET | Refills: 5 | COMMUNITY
Start: 2019-12-20 | End: 2020-01-01

## 2019-12-20 RX ORDER — RAMIPRIL 5 MG/1
5 CAPSULE ORAL EVERY OTHER DAY
Qty: 90 CAPSULE | Refills: 1 | COMMUNITY
Start: 2019-12-20 | End: 2020-01-01

## 2019-12-20 NOTE — TELEPHONE ENCOUNTER
Patient saw [facility] 12/17/19. Avelino Mendes reduced Ramipril to QOD and Furosemide  To daily. Patient wanted to update record/done.   Mona Hall, 12/20/19, 11:36 AM

## 2019-12-20 NOTE — TELEPHONE ENCOUNTER
was seen on 12/15 lasix was left at same dose 20mg, cardiologist changed it to once a day and last time it was changed he ended up in the er

## 2020-01-01 ENCOUNTER — OFFICE VISIT (OUTPATIENT)
Dept: FAMILY MEDICINE CLINIC | Facility: CLINIC | Age: 85
End: 2020-01-01
Payer: MEDICARE

## 2020-01-01 ENCOUNTER — TELEPHONE (OUTPATIENT)
Dept: FAMILY MEDICINE CLINIC | Facility: CLINIC | Age: 85
End: 2020-01-01

## 2020-01-01 ENCOUNTER — LABORATORY ENCOUNTER (OUTPATIENT)
Dept: LAB | Age: 85
End: 2020-01-01
Attending: FAMILY MEDICINE
Payer: MEDICARE

## 2020-01-01 ENCOUNTER — LAB ENCOUNTER (OUTPATIENT)
Dept: LAB | Age: 85
End: 2020-01-01
Attending: FAMILY MEDICINE
Payer: MEDICARE

## 2020-01-01 ENCOUNTER — HOSPITAL ENCOUNTER (OUTPATIENT)
Dept: GENERAL RADIOLOGY | Age: 85
Discharge: HOME | End: 2020-01-01
Attending: FAMILY MEDICINE
Payer: MEDICARE

## 2020-01-01 VITALS
OXYGEN SATURATION: 91 % | BODY MASS INDEX: 24.1 KG/M2 | SYSTOLIC BLOOD PRESSURE: 138 MMHG | HEIGHT: 68 IN | TEMPERATURE: 96 F | HEART RATE: 80 BPM | DIASTOLIC BLOOD PRESSURE: 68 MMHG | RESPIRATION RATE: 20 BRPM | WEIGHT: 159 LBS

## 2020-01-01 VITALS
BODY MASS INDEX: 22.73 KG/M2 | RESPIRATION RATE: 18 BRPM | HEART RATE: 69 BPM | HEIGHT: 68 IN | OXYGEN SATURATION: 98 % | TEMPERATURE: 97 F | SYSTOLIC BLOOD PRESSURE: 110 MMHG | DIASTOLIC BLOOD PRESSURE: 50 MMHG | WEIGHT: 150 LBS

## 2020-01-01 VITALS
DIASTOLIC BLOOD PRESSURE: 42 MMHG | WEIGHT: 152 LBS | HEIGHT: 68 IN | HEART RATE: 80 BPM | SYSTOLIC BLOOD PRESSURE: 90 MMHG | TEMPERATURE: 98 F | BODY MASS INDEX: 23.04 KG/M2 | RESPIRATION RATE: 24 BRPM | OXYGEN SATURATION: 90 %

## 2020-01-01 VITALS
HEIGHT: 68 IN | RESPIRATION RATE: 20 BRPM | WEIGHT: 152 LBS | OXYGEN SATURATION: 97 % | BODY MASS INDEX: 23.04 KG/M2 | TEMPERATURE: 99 F | HEART RATE: 80 BPM | DIASTOLIC BLOOD PRESSURE: 64 MMHG | SYSTOLIC BLOOD PRESSURE: 128 MMHG

## 2020-01-01 VITALS
RESPIRATION RATE: 20 BRPM | HEART RATE: 67 BPM | HEIGHT: 68 IN | SYSTOLIC BLOOD PRESSURE: 130 MMHG | DIASTOLIC BLOOD PRESSURE: 50 MMHG | OXYGEN SATURATION: 99 % | BODY MASS INDEX: 23.19 KG/M2 | WEIGHT: 153 LBS | TEMPERATURE: 98 F

## 2020-01-01 VITALS
BODY MASS INDEX: 23.31 KG/M2 | OXYGEN SATURATION: 90 % | DIASTOLIC BLOOD PRESSURE: 46 MMHG | SYSTOLIC BLOOD PRESSURE: 100 MMHG | WEIGHT: 153.81 LBS | HEART RATE: 65 BPM | RESPIRATION RATE: 22 BRPM | HEIGHT: 68 IN

## 2020-01-01 VITALS
OXYGEN SATURATION: 70 % | RESPIRATION RATE: 20 BRPM | HEIGHT: 68 IN | DIASTOLIC BLOOD PRESSURE: 58 MMHG | BODY MASS INDEX: 23.7 KG/M2 | HEART RATE: 85 BPM | SYSTOLIC BLOOD PRESSURE: 128 MMHG | TEMPERATURE: 98 F | WEIGHT: 156.38 LBS

## 2020-01-01 DIAGNOSIS — Z11.1 SCREENING FOR TUBERCULOSIS: Primary | ICD-10-CM

## 2020-01-01 DIAGNOSIS — E87.1 HYPONATREMIA: ICD-10-CM

## 2020-01-01 DIAGNOSIS — J96.11 CHRONIC RESPIRATORY FAILURE WITH HYPOXIA, ON HOME O2 THERAPY (HCC): ICD-10-CM

## 2020-01-01 DIAGNOSIS — I50.32 CHRONIC DIASTOLIC HEART FAILURE (HCC): ICD-10-CM

## 2020-01-01 DIAGNOSIS — G20 PARKINSON'S DISEASE (HCC): ICD-10-CM

## 2020-01-01 DIAGNOSIS — J43.1 PANLOBULAR EMPHYSEMA (HCC): ICD-10-CM

## 2020-01-01 DIAGNOSIS — Z99.81 CHRONIC RESPIRATORY FAILURE WITH HYPOXIA, ON HOME O2 THERAPY (HCC): ICD-10-CM

## 2020-01-01 DIAGNOSIS — D69.49 PRIMARY THROMBOCYTOPENIA (HCC): ICD-10-CM

## 2020-01-01 DIAGNOSIS — F32.1 CURRENT MODERATE EPISODE OF MAJOR DEPRESSIVE DISORDER WITHOUT PRIOR EPISODE (HCC): ICD-10-CM

## 2020-01-01 DIAGNOSIS — C18.9 MALIGNANT NEOPLASM OF COLON, UNSPECIFIED PART OF COLON (HCC): ICD-10-CM

## 2020-01-01 DIAGNOSIS — R09.02 HYPOXIA: ICD-10-CM

## 2020-01-01 DIAGNOSIS — I25.118 CORONARY ARTERY DISEASE OF NATIVE ARTERY OF NATIVE HEART WITH STABLE ANGINA PECTORIS (HCC): ICD-10-CM

## 2020-01-01 DIAGNOSIS — J84.9 CHRONIC INTERSTITIAL LUNG DISEASE (HCC): ICD-10-CM

## 2020-01-01 DIAGNOSIS — I10 ESSENTIAL HYPERTENSION: ICD-10-CM

## 2020-01-01 DIAGNOSIS — I10 ESSENTIAL HYPERTENSION: Primary | ICD-10-CM

## 2020-01-01 DIAGNOSIS — R06.02 SHORTNESS OF BREATH: Primary | ICD-10-CM

## 2020-01-01 DIAGNOSIS — I50.33 ACUTE ON CHRONIC DIASTOLIC HEART FAILURE (HCC): Primary | ICD-10-CM

## 2020-01-01 DIAGNOSIS — I50.41 ACUTE COMBINED SYSTOLIC AND DIASTOLIC HEART FAILURE (HCC): Primary | ICD-10-CM

## 2020-01-01 DIAGNOSIS — I25.118 CORONARY ARTERY DISEASE OF NATIVE ARTERY OF NATIVE HEART WITH STABLE ANGINA PECTORIS (HCC): Primary | ICD-10-CM

## 2020-01-01 DIAGNOSIS — Z20.822 ENCOUNTER FOR SCREENING LABORATORY TESTING FOR COVID-19 VIRUS IN ASYMPTOMATIC PATIENT: Primary | ICD-10-CM

## 2020-01-01 DIAGNOSIS — I70.92 CHRONIC TOTAL OCCLUSION OF ARTERY OF EXTREMITY (HCC): ICD-10-CM

## 2020-01-01 DIAGNOSIS — Z86.73 HISTORY OF TIA (TRANSIENT ISCHEMIC ATTACK): ICD-10-CM

## 2020-01-01 DIAGNOSIS — N40.1 BENIGN PROSTATIC HYPERPLASIA WITH INCOMPLETE BLADDER EMPTYING: ICD-10-CM

## 2020-01-01 DIAGNOSIS — G25.81 RESTLESS LEGS SYNDROME: ICD-10-CM

## 2020-01-01 DIAGNOSIS — F41.9 ANXIETY: ICD-10-CM

## 2020-01-01 DIAGNOSIS — I50.32 CHRONIC DIASTOLIC HEART FAILURE (HCC): Primary | ICD-10-CM

## 2020-01-01 DIAGNOSIS — J96.11 CHRONIC RESPIRATORY FAILURE WITH HYPOXIA, ON HOME O2 THERAPY (HCC): Primary | ICD-10-CM

## 2020-01-01 DIAGNOSIS — R39.14 BENIGN PROSTATIC HYPERPLASIA WITH INCOMPLETE BLADDER EMPTYING: ICD-10-CM

## 2020-01-01 DIAGNOSIS — Z99.81 CHRONIC RESPIRATORY FAILURE WITH HYPOXIA, ON HOME O2 THERAPY (HCC): Primary | ICD-10-CM

## 2020-01-01 DIAGNOSIS — Z00.00 ENCOUNTER FOR ANNUAL HEALTH EXAMINATION: Primary | ICD-10-CM

## 2020-01-01 DIAGNOSIS — D86.9 SARCOIDOSIS: ICD-10-CM

## 2020-01-01 DIAGNOSIS — I50.41 ACUTE COMBINED SYSTOLIC AND DIASTOLIC HEART FAILURE (HCC): ICD-10-CM

## 2020-01-01 DIAGNOSIS — G47.33 OSA (OBSTRUCTIVE SLEEP APNEA): ICD-10-CM

## 2020-01-01 DIAGNOSIS — I10 BENIGN ESSENTIAL HYPERTENSION: ICD-10-CM

## 2020-01-01 LAB
ALBUMIN SERPL-MCNC: 3.7 G/DL (ref 3.4–5)
ALBUMIN/GLOB SERPL: 1.1 {RATIO} (ref 1–2)
ALP LIVER SERPL-CCNC: 67 U/L (ref 45–117)
ALT SERPL-CCNC: 7 U/L (ref 16–61)
ANION GAP SERPL CALC-SCNC: 7 MMOL/L (ref 0–18)
AST SERPL-CCNC: 19 U/L (ref 15–37)
BASOPHILS # BLD AUTO: 0.03 X10(3) UL (ref 0–0.2)
BASOPHILS NFR BLD AUTO: 0.7 %
BILIRUB SERPL-MCNC: 0.8 MG/DL (ref 0.1–2)
BUN BLD-MCNC: 24 MG/DL (ref 7–18)
BUN/CREAT SERPL: 24.5 (ref 10–20)
CALCIUM BLD-MCNC: 8.4 MG/DL (ref 8.5–10.1)
CHLORIDE SERPL-SCNC: 102 MMOL/L (ref 98–112)
CO2 SERPL-SCNC: 30 MMOL/L (ref 21–32)
CREAT BLD-MCNC: 0.98 MG/DL (ref 0.7–1.3)
DEPRECATED RDW RBC AUTO: 48.3 FL (ref 35.1–46.3)
EOSINOPHIL # BLD AUTO: 0.12 X10(3) UL (ref 0–0.7)
EOSINOPHIL NFR BLD AUTO: 2.7 %
ERYTHROCYTE [DISTWIDTH] IN BLOOD BY AUTOMATED COUNT: 15.9 % (ref 11–15)
GLOBULIN PLAS-MCNC: 3.4 G/DL (ref 2.8–4.4)
GLUCOSE BLD-MCNC: 74 MG/DL (ref 70–99)
HCT VFR BLD AUTO: 35.4 % (ref 39–53)
HGB BLD-MCNC: 10.6 G/DL (ref 13–17.5)
IMM GRANULOCYTES # BLD AUTO: 0.01 X10(3) UL (ref 0–1)
IMM GRANULOCYTES NFR BLD: 0.2 %
LYMPHOCYTES # BLD AUTO: 0.81 X10(3) UL (ref 1–4)
LYMPHOCYTES NFR BLD AUTO: 18.1 %
M PROTEIN MFR SERPL ELPH: 7.1 G/DL (ref 6.4–8.2)
MCH RBC QN AUTO: 25.3 PG (ref 26–34)
MCHC RBC AUTO-ENTMCNC: 29.9 G/DL (ref 31–37)
MCV RBC AUTO: 84.5 FL (ref 80–100)
MONOCYTES # BLD AUTO: 0.52 X10(3) UL (ref 0.1–1)
MONOCYTES NFR BLD AUTO: 11.6 %
NEUTROPHILS # BLD AUTO: 2.98 X10 (3) UL (ref 1.5–7.7)
NEUTROPHILS # BLD AUTO: 2.98 X10(3) UL (ref 1.5–7.7)
NEUTROPHILS NFR BLD AUTO: 66.7 %
OSMOLALITY SERPL CALC.SUM OF ELEC: 291 MOSM/KG (ref 275–295)
PATIENT FASTING Y/N/NP: NO
PLATELET # BLD AUTO: 172 10(3)UL (ref 150–450)
POTASSIUM SERPL-SCNC: 3.9 MMOL/L (ref 3.5–5.1)
RBC # BLD AUTO: 4.19 X10(6)UL (ref 3.8–5.8)
SODIUM SERPL-SCNC: 139 MMOL/L (ref 136–145)
WBC # BLD AUTO: 4.5 X10(3) UL (ref 4–11)

## 2020-01-01 PROCEDURE — 85025 COMPLETE CBC W/AUTO DIFF WBC: CPT

## 2020-01-01 PROCEDURE — 99214 OFFICE O/P EST MOD 30 MIN: CPT | Performed by: FAMILY MEDICINE

## 2020-01-01 PROCEDURE — 80061 LIPID PANEL: CPT

## 2020-01-01 PROCEDURE — 86480 TB TEST CELL IMMUN MEASURE: CPT

## 2020-01-01 PROCEDURE — 90662 IIV NO PRSV INCREASED AG IM: CPT | Performed by: FAMILY MEDICINE

## 2020-01-01 PROCEDURE — 80053 COMPREHEN METABOLIC PANEL: CPT

## 2020-01-01 PROCEDURE — 99213 OFFICE O/P EST LOW 20 MIN: CPT | Performed by: FAMILY MEDICINE

## 2020-01-01 PROCEDURE — 71046 X-RAY EXAM CHEST 2 VIEWS: CPT | Performed by: FAMILY MEDICINE

## 2020-01-01 PROCEDURE — G0439 PPPS, SUBSEQ VISIT: HCPCS | Performed by: FAMILY MEDICINE

## 2020-01-01 PROCEDURE — 36415 COLL VENOUS BLD VENIPUNCTURE: CPT

## 2020-01-01 PROCEDURE — G0008 ADMIN INFLUENZA VIRUS VAC: HCPCS | Performed by: FAMILY MEDICINE

## 2020-01-01 PROCEDURE — 1111F DSCHRG MED/CURRENT MED MERGE: CPT | Performed by: FAMILY MEDICINE

## 2020-01-01 RX ORDER — FUROSEMIDE 20 MG/1
TABLET ORAL
Qty: 60 TABLET | Refills: 5 | Status: SHIPPED | OUTPATIENT
Start: 2020-01-01

## 2020-01-01 RX ORDER — FUROSEMIDE 20 MG/1
60 TABLET ORAL DAILY
Qty: 60 TABLET | Refills: 5 | COMMUNITY
Start: 2020-01-01 | End: 2020-01-01

## 2020-01-01 RX ORDER — TAMSULOSIN HYDROCHLORIDE 0.4 MG/1
CAPSULE ORAL
Qty: 90 CAPSULE | Refills: 1 | Status: SHIPPED | OUTPATIENT
Start: 2020-01-01 | End: 2020-01-01

## 2020-01-01 RX ORDER — ALBUTEROL SULFATE 2.5 MG/3ML
2.5 SOLUTION RESPIRATORY (INHALATION) EVERY 6 HOURS PRN
Qty: 100 VIAL | Refills: 3 | Status: SHIPPED | OUTPATIENT
Start: 2020-01-01 | End: 2020-01-01

## 2020-01-01 RX ORDER — CLONAZEPAM 1 MG/1
TABLET ORAL
Qty: 30 TABLET | Refills: 1 | Status: SHIPPED | OUTPATIENT
Start: 2020-01-01 | End: 2020-01-01

## 2020-01-01 RX ORDER — TAMSULOSIN HYDROCHLORIDE 0.4 MG/1
CAPSULE ORAL
Qty: 90 CAPSULE | Refills: 1 | Status: SHIPPED | OUTPATIENT
Start: 2020-01-01 | End: 2021-01-01

## 2020-01-01 RX ORDER — FUROSEMIDE 20 MG/1
TABLET ORAL
Qty: 180 TABLET | Refills: 1 | Status: SHIPPED | OUTPATIENT
Start: 2020-01-01 | End: 2020-01-01

## 2020-01-01 RX ORDER — IPRATROPIUM BROMIDE AND ALBUTEROL SULFATE 2.5; .5 MG/3ML; MG/3ML
SOLUTION RESPIRATORY (INHALATION)
Qty: 100 VIAL | Refills: 0 | Status: SHIPPED | OUTPATIENT
Start: 2020-01-01 | End: 2020-01-01

## 2020-01-01 RX ORDER — OMEPRAZOLE 20 MG/1
CAPSULE, DELAYED RELEASE ORAL
Qty: 90 CAPSULE | Refills: 3 | Status: SHIPPED | OUTPATIENT
Start: 2020-01-01 | End: 2020-01-01

## 2020-01-01 RX ORDER — IPRATROPIUM BROMIDE AND ALBUTEROL SULFATE 2.5; .5 MG/3ML; MG/3ML
SOLUTION RESPIRATORY (INHALATION)
Qty: 120 VIAL | Refills: 1 | Status: SHIPPED | OUTPATIENT
Start: 2020-01-01

## 2020-01-01 RX ORDER — RAMIPRIL 1.25 MG/1
1.25 CAPSULE ORAL DAILY
Qty: 90 CAPSULE | Refills: 1 | Status: SHIPPED | OUTPATIENT
Start: 2020-01-01

## 2020-01-01 RX ORDER — ATORVASTATIN CALCIUM 10 MG/1
TABLET, FILM COATED ORAL
Qty: 90 TABLET | Refills: 1 | Status: SHIPPED | OUTPATIENT
Start: 2020-01-01 | End: 2020-01-01

## 2020-01-01 RX ORDER — ATORVASTATIN CALCIUM 10 MG/1
TABLET, FILM COATED ORAL
Qty: 90 TABLET | Refills: 1 | Status: SHIPPED | OUTPATIENT
Start: 2020-01-01

## 2020-01-01 RX ORDER — ROPINIROLE 0.25 MG/1
2 TABLET, FILM COATED ORAL NIGHTLY
COMMUNITY
Start: 2020-01-01

## 2020-01-01 RX ORDER — IPRATROPIUM BROMIDE AND ALBUTEROL SULFATE 2.5; .5 MG/3ML; MG/3ML
3 SOLUTION RESPIRATORY (INHALATION) EVERY 6 HOURS PRN
COMMUNITY

## 2020-01-01 RX ORDER — CLONAZEPAM 1 MG/1
TABLET ORAL
Qty: 30 TABLET | Refills: 5 | Status: SHIPPED | OUTPATIENT
Start: 2020-01-01

## 2020-01-01 RX ORDER — RAMIPRIL 1.25 MG/1
1.25 CAPSULE ORAL DAILY
Qty: 30 CAPSULE | Refills: 5 | Status: SHIPPED | OUTPATIENT
Start: 2020-01-01 | End: 2020-01-01

## 2020-01-01 RX ORDER — ALBUTEROL SULFATE 2.5 MG/3ML
2.5 SOLUTION RESPIRATORY (INHALATION) EVERY 6 HOURS PRN
Qty: 100 VIAL | Refills: 3 | Status: SHIPPED | OUTPATIENT
Start: 2020-01-01 | End: 2020-01-01 | Stop reason: ALTCHOICE

## 2020-01-01 RX ORDER — CLONAZEPAM 1 MG/1
TABLET ORAL
Qty: 30 TABLET | Refills: 0 | Status: SHIPPED | OUTPATIENT
Start: 2020-01-01 | End: 2020-01-01

## 2020-01-02 ENCOUNTER — TELEPHONE (OUTPATIENT)
Dept: FAMILY MEDICINE CLINIC | Facility: CLINIC | Age: 85
End: 2020-01-02

## 2020-01-22 PROBLEM — B37.81 THRUSH OF MOUTH AND ESOPHAGUS (HCC): Status: RESOLVED | Noted: 2017-12-07 | Resolved: 2020-01-01

## 2020-01-22 PROBLEM — B37.0 THRUSH OF MOUTH AND ESOPHAGUS (HCC): Status: RESOLVED | Noted: 2017-12-07 | Resolved: 2020-01-01

## 2020-01-22 NOTE — PATIENT INSTRUCTIONS
Take Ramipril 5 mg every other day. Take Furosemide 20 mg every other day; increase it to daily if needed.

## 2020-01-22 NOTE — PROGRESS NOTES
[address]  PROGRESS NOTE  Chief Complaint:   Patient presents with: Follow - Up      HPI:   This is a 80year old male coming in for follow-up. He said that overall he feels like he is doing much better.   He is taking the ramipril 5 46.3 fL    Neutrophil Absolute Prelim 2.98 1.50 - 7.70 x10 (3) uL    Neutrophil Absolute 2.98 1.50 - 7.70 x10(3) uL    Lymphocyte Absolute 0.81 (L) 1.00 - 4.00 x10(3) uL    Monocyte Absolute 0.52 0.10 - 1.00 x10(3) uL    Eosinophil Absolute 0.12 0.00 - 0.7 Antibiotics       HIVES  Codeine                     Comment:Other reaction(s): slowed heart rate  Hydrochlorothiazide*        Comment:Other reaction(s): stomach cramping and diarrhea  Levofloxacin                Comment:Other reaction(s): bothered his leg REVIEW OF SYSTEMS:   CONSTITUTIONAL: He has actually lost 6 pounds since his last visit here. He denies feeling full or bloated now. EENT:  Eyes:  Denies eye pain, visual loss, blurred vision, double vision or yellow sclerae.  Ears, Nose, Throat:  Lissa Hou Eyes: EOMI, PERRLA, no scleral icterus, conjunctivae clear bilaterally, no eye discharge Ears: External normal. Nose: patent, no nasal discharge Throat:  No tonsillar erythema or exudate. Mouth:  No oral lesions or ulcerations, good dentition.   NECK: Supp [facility])  His Parkinson's is actually well controlled now. 9. Hyponatremia  His sodium level is up to 139. His previous hyponatremia is much improved. 10. MADELEINE (obstructive sleep apnea)  Unchanged. Recheck in 2 months. No labs then.   We will plan on Benign prostatic hyperplasia with incomplete bladder emptying     Functional diarrhea     Coronary artery disease involving native coronary artery of native heart     Chronic diastolic heart failure (HCC)     Acute combined systolic and diastolic heart richi

## 2020-02-03 NOTE — TELEPHONE ENCOUNTER
Future appt:     Your appointments     Date & Time Appointment Department [facility])    Mar 24, 2020  9:30 AM CDT Follow Up Visit with Veena Monterroso MD 25 [facility], Glenda Rausch ([facility])            Daysi Faye

## 2020-03-13 NOTE — TELEPHONE ENCOUNTER
Future appt:     Your appointments     Date & Time Appointment Department [facility])    Mar 24, 2020  9:30 AM CDT Medicare Annual Well Visit with Nayana Abreu MD 25 [facility], [place] ([facility]            Edw

## 2020-03-13 NOTE — TELEPHONE ENCOUNTER
Patient states he stopped the Ramipril. States blood pressure a couple days ago  74/38. Today 98/50's. Please advise.   Theresa Hartman, 03/13/20, 10:47 AM

## 2020-05-01 NOTE — TELEPHONE ENCOUNTER
[id number] [facility] would like to schedule a 7 day follow up for this patient since he is being discharged today     Yuni Viveros says he would like a call back before 3:30 pm     Can be reached at [phone]

## 2020-05-01 NOTE — TELEPHONE ENCOUNTER
Appt for Post [facility] Discharge given. Anatoliy Zaldivar, 05/01/20, 3:23 PM    Future appt:     Your appointments     Date & Time Appointment Department [facility])    May 07, 2020  1:30 PM CDT Exam - Established with Teja Elias MD [id number]

## 2020-05-05 NOTE — TELEPHONE ENCOUNTER
Please call Gene. His labs actually look slightly better than when he was in the hospital.  His hemoglobin her blood count is about the same. His BNP level is actually slightly improved. When he was in the hospital it was 1300. It is now 1100.   His kid

## 2020-05-05 NOTE — TELEPHONE ENCOUNTER
pt at home and she is heading to see him in appx 30 min -   they spoke this am  and he c/o SOB  -  does  want BW -  has not had BNP drawn since hospital stay    Call ASAP

## 2020-05-05 NOTE — TELEPHONE ENCOUNTER
[facility] Nurse informed of below. Expressed understanding. Robson Barrientos will call patient.   Milka Luke, 05/05/20, 4:00 PM

## 2020-05-06 NOTE — TELEPHONE ENCOUNTER
looking for verbal approval  for PT / nursing for home health          Future Appointments   Date Time Provider Wilfred Vida   5/7/2020  1:30 PM Michelet Gaytan MD [facility]   5/22/2020 11:00 AM Michelet Gaytan MD [facility] [place] [facility]

## 2020-05-07 PROBLEM — R06.00 DYSPNEA: Status: ACTIVE | Noted: 2020-01-01

## 2020-05-07 NOTE — PROGRESS NOTES
[facility]  [facility]  Chief Complaint:   Patient presents with:  Hospital F/U: Hospital f/u for CHF and Pneumonia       HPI:   This is a 80year old male coming in for hospital follow-up.   He was admitted to [facility] HCT 33.6     MCV 79.3 fL    MCH 22.7 pg    MCHC 28.6 g/dL    RDW-CV 16.0     PLATELET COUNT 873     MPV 8.1     POTASSIUM, SERUM 5.0     CHLORIDE, SERUM 97     CO2 36     ANION GAP 3     BUN 27     CREATININE, SERUM 0.85     EGFR IF NONAFRICN AM 90     Bun COMMENTS)    Comment:Other reaction(s): Swelling  Sulfa Antibiotics       HIVES  Codeine                     Comment:Other reaction(s): slowed heart rate  Hydrochlorothiazide*        Comment:Other reaction(s): stomach cramping and diarrhea  Levofloxacin pain, chest pressure, chest discomfort, palpitations, edema, dyspnea on exertion or at rest.  RESPIRATORY: He does get frequent episodes of dyspnea. GASTROINTESTINAL:  Denies abdominal pain, nausea, vomiting, constipation, diarrhea, or blood in stool.   SUJEY walking. ASSESSMENT AND PLAN:   1. Shortness of breath  He has episodes of shortness of breath that apparently are associated with bradycardia as well. Plan: For now, we will stop his fluoxetine, ramipril, and decrease his furosemide to 20 mg daily.   H ([address]     Eczema     Esophageal reflux     Hypercalcemia     Essential hypertension     Restless legs syndrome     Primary thrombocytopenia (HCC)     Sarcoidosis     Basal cell carcinoma     Transient global amnesia     Hemispheric carotid artery syndrome

## 2020-05-12 NOTE — TELEPHONE ENCOUNTER
Lookig for a prescription for a higher liter oxygen machine  Future Appointments   Date Time Provider Wilfred Mcpherson   5/22/2020 11:00 AM Andrew Jolley MD [facility] [facility]

## 2020-05-12 NOTE — TELEPHONE ENCOUNTER
Detailed message left on pt's voicemail that order has been sent to [facility]. Pt asked to call back with any questions.

## 2020-05-12 NOTE — TELEPHONE ENCOUNTER
Pt states that he is currently on 5L of O2 at home. He is asking for an order for a portable machine that will go as high as 5L. He states that the portable O2 machine that he has now only goes to 3L so he has to spend more time inside.      He does have

## 2020-05-18 NOTE — TELEPHONE ENCOUNTER
Future appt:     Your appointments     Date & Time Appointment Department [facility])    May 22, 2020 11:00 AM CDT Medicare Annual Well Visit with Goran Arndt MD [address] [place] [address], [facility]            [place]

## 2020-05-18 NOTE — TELEPHONE ENCOUNTER
Order was written on May 12, 2020. Please send that same order along with the progress note from that visit and the discharge summary from his hospitalization at [facility] on April 28, 2020.

## 2020-05-18 NOTE — TELEPHONE ENCOUNTER
Patient wants to get a Portable Oxygen Concentrator. Needs verification of Oxygen. Can fax order or chart notes to [phone].

## 2020-05-22 PROBLEM — E87.1 HYPONATREMIA: Status: RESOLVED | Noted: 2018-01-12 | Resolved: 2020-01-01

## 2020-05-22 PROBLEM — R42 DIZZINESS: Status: RESOLVED | Noted: 2018-01-25 | Resolved: 2020-01-01

## 2020-05-22 PROBLEM — S22.000A CLOSED COMPRESSION FRACTURE OF THORACIC VERTEBRA (HCC): Status: RESOLVED | Noted: 2018-05-16 | Resolved: 2020-01-01

## 2020-05-22 PROBLEM — T14.8XXA MUSCLE TEAR: Status: RESOLVED | Noted: 2018-03-21 | Resolved: 2020-01-01

## 2020-05-22 PROBLEM — M25.561 ACUTE PAIN OF RIGHT KNEE: Status: RESOLVED | Noted: 2018-03-21 | Resolved: 2020-01-01

## 2020-05-22 PROBLEM — R10.13 EPIGASTRIC PAIN: Status: RESOLVED | Noted: 2017-12-05 | Resolved: 2020-01-01

## 2020-05-22 NOTE — PATIENT INSTRUCTIONS
Please download the [facility]-ER kay and schedule a video visit in 1 month. Recommended Websites for Advanced Directives    [email or web address]  An information packet, including necessary form from the [place]

## 2020-05-23 NOTE — PROGRESS NOTES
HPI:   Shane Hughes is a 80year old male who presents for a Medicare annual wellness visit.     His last annual assessment has been over 1 year: Annual Physical due on 03/18/2020       He is actually feeling quite a bit better than he was a couple week Cognitive Assessment             Functional Ability/Status   Dashawn Costa has some abnormal functions as listed below:  He has Dressing and/or Bathing issues based on screening of functional status.    Difficulty dressing or bathing?: Y  Bathing or Sh quittin.4      Smokeless tobacco: Never Used       Mr. Libby Hauser already takes aspirin and has it on his medication list.   CAGE Alcohol screening   Gene D Igor was screened for Alcohol abuse and had a score of 0 so is at low risk.      Patient Ca 01/15/2020    TSH 4.090 12/13/2018    CREATSERUM 0.85 05/05/2020    GLU 74 01/15/2020        CBC  (most recent labs)   Lab Results   Component Value Date    WBC 4.5 01/15/2020    HGB 9.6 05/05/2020     05/05/2020        ALLERGIES:   He is allergic t Carotid endarterectomy (Right, 2007); angioplasty (coronary) (9/2010); and parathyroidectomy (2015). His family history is not on file. SOCIAL HISTORY:   He  reports that he quit smoking about 23 years ago. His smoking use included cigarettes.  He star Mucous membranes are moist.      Pharynx: Oropharynx is clear. Eyes:      Conjunctiva/sclera: Conjunctivae normal.      Pupils: Pupils are equal, round, and reactive to light. Neck:      Musculoskeletal: Normal range of motion and neck supple.    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     Fasting Blood Sugar (FSB)Annually Glucose (mg/dL)   Date Value   05/ history found This may be covered with your prescription benefits, but Medicare does not cover unless Medically needed    Zoster   Not covered by Medicare Part B No vaccine history found This may be covered with your pharmacy  prescription benefits      SP

## 2020-05-26 NOTE — TELEPHONE ENCOUNTER
It is okay to try to wean from 5 L down to even 2 L with exertion. The recent experience has been that he requires 5 L virtually constantly to keep his hands up above 85. If we can wean him down to 3 that would be great.   Plan: Permission granted to try

## 2020-05-26 NOTE — TELEPHONE ENCOUNTER
Patient was realesed from hospital and diagnosed with ammonia.  his oxygen is on a 5     Wants to know if Nayana Salcido can trial patient to go back down to his 3 liters of oxygen       Call patient or Lady Manning if needed

## 2020-05-26 NOTE — TELEPHONE ENCOUNTER
Kathryn Farmer is asking if Patient should start trialing down to 3L per Minute. Patient is currently on 5L. Kathryn Farmer feels that patient is wanting to leave the house and cannot he leave the house on  5 liters. Please advise.   Marly, 05/26/20, 2:47 PM

## 2020-05-27 NOTE — TELEPHONE ENCOUNTER
Veronica Dean informed of below. She will see patient on Saturday and will see how it goes at 3L with exertion.   Sandra Alford, 05/27/20, 12:01 PM

## 2020-06-08 NOTE — TELEPHONE ENCOUNTER
Follow up appt given. Ashely Lopez, 06/08/20, 1:25 PM    Future appt:     Your appointments     Date & Time Appointment Department [facility])    Jun 24, 2020  1:00 PM CDT Exam - Established with Veena Monterroso MD [address] [place], [place] [address]

## 2020-06-11 NOTE — TELEPHONE ENCOUNTER
Future appt:     Your appointments     Date & Time Appointment Department [facility])    Jun 24, 2020  1:00 PM CDT Exam - Established with Delon Coles MD 25 [facility] [place] [address]

## 2020-06-24 PROBLEM — J96.11 CHRONIC RESPIRATORY FAILURE WITH HYPOXIA, ON HOME O2 THERAPY (HCC): Status: ACTIVE | Noted: 2020-01-01

## 2020-06-24 PROBLEM — Z99.81 CHRONIC RESPIRATORY FAILURE WITH HYPOXIA, ON HOME O2 THERAPY (HCC): Status: ACTIVE | Noted: 2020-01-01

## 2020-06-24 NOTE — PROGRESS NOTES
[address]  PROGRESS NOTE  Chief Complaint:   Patient presents with: Follow - Up      HPI:   This is a 80year old male coming in for follow-up on his cough and medication.     He said that he is actually feeling a little bit worse than SURGICAL PROSTATECTOMY, RETROPUBIC RADICAL, W/NERVE SPARING  2011   • PARATHYROIDECTOMY  2015     Social History:  Social History    Tobacco Use      Smoking status: Former Smoker        Packs/day: 1.00        Years: 31.00        Pack years: 31        Type (two) times daily as needed. 1 Container 0   • Respiratory Therapy Supplies (NEBULIZER/TUBING/MOUTHPIECE) Does not apply Kit 1 kit by Does not apply route daily as needed.  Dx:  J44.9/ D86.9/ J43.1 1 kit 5   • carbidopa-levodopa  MG Oral Tab Take 2 ta Ht 68\"   Wt 159 lb (72.1 kg)   SpO2 91%   BMI 24.18 kg/m²  Estimated body mass index is 24.18 kg/m² as calculated from the following:    Height as of this encounter: 68\". Weight as of this encounter: 159 lb (72.1 kg). Vital signs reviewed.   Physica heart failure. 4. Essential hypertension  His blood pressure is elevated. The ramipril should be helpful. 5. Chronic respiratory failure with hypoxia, on home O2 therapy (HCC)  Unchanged. Continue the same home oxygen.       Meds & Refills for this and diastolic heart failure (HCC)     Dyspnea     Chronic respiratory failure with hypoxia, on home O2 therapy ([facility]      Fransisco Lopez MD  6/24/2020  2:14 PM

## 2020-06-29 NOTE — TELEPHONE ENCOUNTER
Katelyn Drake from [facility] wants to let Dr know that pt is getting recertified for another 9 weeks of HH- is there any additional orders Dr would like to add to the initial order?  -  They want to continue [place] with pt due to elevated BMP levels and for his CHF

## 2020-06-29 NOTE — TELEPHONE ENCOUNTER
Please let [facility] know the Dr. Lin Ware is out of the office until July 7. Please let us know if they request any further orders before that time.

## 2020-07-09 NOTE — TELEPHONE ENCOUNTER
ANAI: Patient wanted to let Lakhwinder Gray and Dr. Susi Cabrera know that the blood pressure medication seems to be working okay for him

## 2020-07-21 NOTE — TELEPHONE ENCOUNTER
Future appt:     Your appointments     Date & Time Appointment Department [facility])    Aug 04, 2020  4:00 PM CDT Follow Up Visit with Farzad Bahena MD [address] [place] [address] Road, [facility]

## 2020-07-27 NOTE — TELEPHONE ENCOUNTER
Future appt:     Your appointments     Date & Time Appointment Department [facility])    Aug 04, 2020  4:00 PM CDT Follow Up Visit with Delon Coles MD [address] [place] [address], [facility]

## 2020-07-28 NOTE — TELEPHONE ENCOUNTER
Future appt:     Your appointments     Date & Time Appointment Department [facility])    Aug 04, 2020  4:00 PM CDT Follow Up Visit with Jonna Aranda MD 25 [facility], [facility] ([place])            [address] [place] [address]

## 2020-08-04 NOTE — PROGRESS NOTES
[address]  PROGRESS NOTE  Chief Complaint:   Patient presents with: Follow - Up      HPI:   This is a 80year old male coming in for follow-up on his medication.     He said the low-dose ramipril has not caused his blood pressure to go thrombocytopenia ([facility] 7/22/2011   • Restless leg syndrome 12/29/2009   • Sarcoidosis 5/16/2017   • Stenosis of carotid artery 5/16/2017     Past Surgical History:   Procedure Laterality Date   • ANGIOPLASTY (CORONARY)  9/2010   • CAROTID ENDARTERECTOMY Ri HCl 0.25 MG Oral Tab Take 2 tablets by mouth every evening. • ramipril 1.25 MG Oral Cap Take 1 capsule (1.25 mg total) by mouth daily.  30 capsule 5   • ATORVASTATIN 10 MG Oral Tab TAKE ONE TABLET BY MOUTH EVERY DAY AT NIGHT 90 tablet 1   • ipratropium- kg/m² as calculated from the following:    Height as of this encounter: 68\". Weight as of this encounter: 156 lb 6.4 oz (70.9 kg). Vital signs reviewed. Physical Exam:  GEN: He walked in with his portable oxygen device.   When he first walked and he medications. Recheck in 3 months.       Meds & Refills for this Visit:  Requested Prescriptions      No prescriptions requested or ordered in this encounter       Health Maintenance:  LDL Control due on 12/13/2019    Patient/Caregiver Education: Patient/Ca

## 2020-08-22 NOTE — TELEPHONE ENCOUNTER
Deadra Means states patient's hearing aid has been missing for a few days. Patient states he can feel something in his ear canal and feels it is his hearing aid and patient is distressed about it.     Advised [facility] for evaluation and possible removal/

## 2020-08-27 NOTE — TELEPHONE ENCOUNTER
being d/c'd from home care today  as all goals have been met ( was in their care for 18wks )     only c/o is RLS  -   nothing they can help with      FYI    willing to resume if any further needs      Future Appointments   Date Time Provider Department [place]

## 2020-09-03 NOTE — TELEPHONE ENCOUNTER
Fax received stating patient's insurance covers a 90 day supply of Ramipril better than a 30 day supply. Please advise. Future appt:     Your appointments     Date & Time Appointment Department [facility])    Nov 04, 2020 11:00 AM CST Follow Up Visit with

## 2020-09-11 NOTE — TELEPHONE ENCOUNTER
Patient informed [facility]   Kirill Arce, 09/11/20, 5:00 PM

## 2020-09-11 NOTE — TELEPHONE ENCOUNTER
Medicare does not cover DuoNeb. Please advise if patient ok with just Albuterol for Nebulizer of if Rx should be split.   London Herrera, 09/11/20, 3:23 PM

## 2020-09-11 NOTE — TELEPHONE ENCOUNTER
I will send in a prescription for albuterol for the nebulizer. Please let him know that Medicare does not cover DuoNeb.

## 2020-09-12 NOTE — TELEPHONE ENCOUNTER
Fax received from [facility]. ICD-10 code needs to be added to the Rx for Ventolin sent yesterday. Please resend.

## 2020-09-12 NOTE — TELEPHONE ENCOUNTER
Dr. Shirley Martinez out of the office. Albuterol nebulizers sent yesterday. ICD 10 diagnoses added. New script sent under Dr. Shirley Martinez with ICD codes attached.

## 2020-09-16 NOTE — TELEPHONE ENCOUNTER
was at [facility] beds at the [facility] admitted 9/13   d/c'd today 9/16     pneumonia / CHF flare up      d/c papers do not indicate a f/up with Dr Elia Matthews        please advise

## 2020-09-16 NOTE — TELEPHONE ENCOUNTER
Appt given for Post [facility] Discharge. Marie Every, 09/16/20, 5:56 PM    Future appt:     Your appointments     Date & Time Appointment Department [facility])    Sep 26, 2020 10:30 AM CDT Exam - Established with Landry Black MD [address]

## 2020-09-18 NOTE — TELEPHONE ENCOUNTER
RF albuteral solution to [facility]      no c/b necessary       Future Appointments   Date Time Provider Wilfred Vida   9/26/2020 10:30 AM Maciel Sawyer MD [facility] [place] [facility] [place]   11/4/2020 11:00 AM Maciel Sawyer MD [facility] [place] [facility] [place]

## 2020-09-18 NOTE — TELEPHONE ENCOUNTER
Per [facility]-  Medicare does cover DuoNeb for Patient. Patient has been getting DuoNeb all along vs   Plain Albuterol from [address] [place] [address] Road. Requesting resend of DuoNeb.   Abran Feldman, 09/18/20, 10:27 AM

## 2020-09-18 NOTE — TELEPHONE ENCOUNTER
Percy Vergara from [facility] wants to know if  will be following pt through [facility] and sign off on orders

## 2020-09-18 NOTE — TELEPHONE ENCOUNTER
Dr. Kalina Canada out of the office. Chart reviewed, tolerating medication well without difficulties (long-term medication). 30 day refill provided, [place]  reviewed.

## 2020-09-18 NOTE — TELEPHONE ENCOUNTER
Future appt:     Your appointments     Date & Time Appointment Department [facility])    Sep 26, 2020 10:30 AM CDT Exam - Established with Shekhar Araya MD 25 [facility] ([facility])        Nov 04, 2020 11

## 2020-09-26 PROBLEM — I50.33 ACUTE ON CHRONIC DIASTOLIC HEART FAILURE (HCC): Status: ACTIVE | Noted: 2019-11-03

## 2020-09-26 NOTE — PROGRESS NOTES
[facility] [place]  PROGRESS NOTE  Chief Complaint:   Patient presents with:  Hospital F/U      HPI:   This is a 80year old male coming in for follow-up on his hospitalization.   He went to [facility] on September 13 with increasing demetrio NATRIURETIC PEPTIDE 1,159        Past Medical History:   Diagnosis Date   • Anxiety    • Arthritis    • Cancer [facility])     colon   • COPD (chronic obstructive pulmonary disease) ([email or web address]    • Eczema 4/9/2012   • Esophageal reflux    • Essential hypertension tongue  Trimethoprim            HIVES  Current Meds:  Current Outpatient Medications   Medication Sig Dispense Refill   • CLONAZEPAM 1 MG Oral Tab TAKE ONE TABLET BY MOUTH IN THE EVENING AS NEEDED FOR ANXIETY 30 tablet 0   • ipratropium-albuterol 0.5-2.5 ( improved now. GASTROINTESTINAL:  Denies abdominal pain, nausea, vomiting, constipation, diarrhea, or blood in stool. MUSCULOSKELETAL:  Denies weakness, muscle aches, back pain, joint pain, swelling or stiffness.   NEUROLOGICAL:  Denies headache, seizures, to [facility] with acute on chronic congestive heart failure. The acute heart failure has been adequately treated. The question now is how to avoid future episodes. Plan: Please limit the sodium in the diet.   We discussed high sodium types of f ischemic attack)     Parkinson's disease ([facility]     Episode of unresponsiveness     MADELEINE (obstructive sleep apnea)     Transient cerebral ischemia     Fall     Hypoxia     Benign prostatic hyperplasia with incomplete bladder emptying     Functional diarrhea

## 2020-09-26 NOTE — PATIENT INSTRUCTIONS
Try to avoid high sodium foods. Adjust the dose of furosemide. On most days, take 2 tablets or 40 mg daily. On bad days increase that to 3 tablets and on a really bad day it can go up to 4 tablets for one day only. [place]

## 2020-09-30 NOTE — TELEPHONE ENCOUNTER
Daughter Elias Arrington informed Order for Gustavo Goodwin 42 sent to [facility].   Mikki Pittman, 09/30/20, 4:01 PM

## 2020-09-30 NOTE — TELEPHONE ENCOUNTER
Patient needing a Negative Covid19 Test to move into [facility]. Has chosen not to move into [facility]. Please advise Order to [facility].   Marie Matos, 09/30/20, 3:33 PM

## 2020-10-02 NOTE — TELEPHONE ENCOUNTER
Patient's daughter Frank Wheat informed that the order was faxed to [facility] on 9/30/2020. She will contact [facility] to see if they have the order and then proceed to take patient to the covid clinic for testing.

## 2020-10-05 NOTE — TELEPHONE ENCOUNTER
Future appt:     Your appointments     Date & Time Appointment Department [facility])    Nov 04, 2020 11:00 AM CST Follow Up Visit with Shekhar Araya MD [address] [place] [address]

## 2020-10-07 NOTE — TELEPHONE ENCOUNTER
Received fax from [facility] regarding patient's election to move into assisted living community. Dr. Cheli Kumar out of the office on personal leave. Prior follow up was 09/26/20 for hospitalization follow-up.     Patient will need an i

## 2020-10-07 NOTE — TELEPHONE ENCOUNTER
[facility] states sisters have their responsibilities for their dad's upcoming care and moving. States Gala works at [employer]. Phoned Gala--  Patient is going into independent care.   Will not need the paperwork that was sent to   for aw

## 2020-10-08 NOTE — TELEPHONE ENCOUNTER
It appears the patient will be going to independent care from phone call yesterday, won't be transitioning to assisted living for a few months.

## 2020-10-08 NOTE — TELEPHONE ENCOUNTER
Per Chino Dukes, she has been seeing patient for home health nursing care. States she is d/c patient early as he no longer needs skilled nursing. States patient is moving into [facility] this weekend.   Chino Dukes is needing a verbal OK to d/c patient ear

## 2020-10-13 NOTE — TELEPHONE ENCOUNTER
Future appt:     Your appointments     Date & Time Appointment Department [facility])    Nov 04, 2020 11:00 AM CST Follow Up Visit with Andrew Jolley MD 25 [facility], [facility] ([place])            [address] [place] [address]

## 2020-10-13 NOTE — TELEPHONE ENCOUNTER
Future appt:     Your appointments     Date & Time Appointment Department [facility])    Nov 04, 2020 11:00 AM CST Follow Up Visit with Nadine Garcia MD 25 [facility], Adilene Hunter ([facility])            [address] [place] [address]

## 2020-10-14 NOTE — TELEPHONE ENCOUNTER
Dr. Goldman Salvage out of the office. Chronic medication. Last filled 09/18/20, [place]  reviewed. Refill sent, fill no sooner than 10/18/20.

## 2020-10-29 NOTE — TELEPHONE ENCOUNTER
Ronna Denny (daughter) from [facility] states that there was paperwork being held by [facility] for when the patient is ready to be switched to assisted living. I informed Gala that I will check with [facility] regarding the paperwork and return the call.  P

## 2020-10-29 NOTE — TELEPHONE ENCOUNTER
pt is currently an independent resident but wants to be an assisted living resident- dandre, from [facility], wants to talk to nurse

## 2020-11-04 NOTE — TELEPHONE ENCOUNTER
Quantiferon gold order placed, will not let me order through THE [facility], says through [facility].  Printed order given to lab.

## 2020-11-04 NOTE — TELEPHONE ENCOUNTER
Patient is going to Assisted Living portion of   [employer]. Needing Quantiferon Gold Testing. Please advise order.   Mona Hall, 11/04/20, 10:37 AM

## 2020-11-10 NOTE — TELEPHONE ENCOUNTER
----- Message from ARSLAN Lakhani sent at 11/9/2020  8:49 AM CST -----  Test ordered while Dr. Nayana Salcido out of office. TB test negative.

## 2020-11-10 NOTE — TELEPHONE ENCOUNTER
Gala informed paperwork completed. Gala asked that paperwork be faxed to   [facility].   Albert Nash, 11/10/20, 1:26 PM

## 2020-11-16 NOTE — TELEPHONE ENCOUNTER
Future appt:     Your appointments     Date & Time Appointment Department [facility])    Dec 07, 2020 10:30 AM CST Follow Up Visit with Andrew Jolley MD [address] [facility] [address], Emanuel Calvo ([facility])            [address] [place] [address]

## 2020-12-07 NOTE — PROGRESS NOTES
[address]  PROGRESS NOTE  Chief Complaint:   Patient presents with: Follow - Up: general      HPI:   This is a 80year old male coming in for medical follow-up visit.   He was hospitalized at [facility] from September 13 lizandro 1946        Quit date: 1997        Years since quittin.9      Smokeless tobacco: Never Used    Alcohol use: No      Alcohol/week: 0.0 standard drinks    Drug use: No    Family History:  History reviewed. No pertinent family history.   Allergies ipratropium-albuterol 0.5-2.5 (3) MG/3ML Inhalation Solution USE ONE VIAL VIA NEBULIZER EVERY 6 HOURS AS NEEDED (Patient not taking: Reported on 12/7/2020 ) 120 vial 1   • ipratropium-albuterol 0.5-2.5 (3) MG/3ML Inhalation Solution Take 3 mL by nebulizati Vital signs reviewed. Physical Exam:  GEN: He walked in with a cane. He was able to climb on the exam table without assistance. He is getting around relatively well.   HEENT:  Head:  Normocephalic, atraumatic Eyes: EOMI, PERRLA, no scleral icterus, con (14); Future  - LIPID PANEL; Future    5. Chronic respiratory failure with hypoxia, on home O2 therapy (HCC)  He has a history of chronic respiratory failure with hypoxemia. Uses home oxygen continuously.     6. Parkinson's disease ([address]  His Parkinson's i unresponsiveness     MADELEINE (obstructive sleep apnea)     Transient cerebral ischemia     Fall     Hypoxia     Benign prostatic hyperplasia with incomplete bladder emptying     Functional diarrhea     Coronary artery disease involving native coronary artery o

## 2020-12-08 NOTE — TELEPHONE ENCOUNTER
----- Message from Tori Booker MD sent at 12/8/2020 10:13 AM CST -----  Good news on the blood work. The hemoglobin was 9.8. This is slightly less than January when it was 10.6. This is not low enough to require any new treatment at this time.   Rosita Hull

## 2020-12-09 NOTE — TELEPHONE ENCOUNTER
Future appt:     Your appointments     Date & Time Appointment Department [facility]    Jan 04, 2021 10:30 AM CST Follow up - Extended with Milla Chan MD [address] [place] [address], [place] ([facility])            [facility]

## 2020-12-11 NOTE — TELEPHONE ENCOUNTER
Future appt:     Your appointments     Date & Time Appointment Department [facility])    Jan 04, 2021 10:30 AM CST Follow up - Extended with Greg Choudhury MD [address] [facility]            Katharina Nguyen

## 2021-01-01 ENCOUNTER — OFFICE VISIT (OUTPATIENT)
Dept: FAMILY MEDICINE CLINIC | Facility: CLINIC | Age: 86
End: 2021-01-01
Payer: MEDICARE

## 2021-01-01 ENCOUNTER — TELEPHONE (OUTPATIENT)
Dept: FAMILY MEDICINE CLINIC | Facility: CLINIC | Age: 86
End: 2021-01-01

## 2021-01-01 VITALS
TEMPERATURE: 98 F | HEART RATE: 67 BPM | RESPIRATION RATE: 18 BRPM | SYSTOLIC BLOOD PRESSURE: 90 MMHG | DIASTOLIC BLOOD PRESSURE: 60 MMHG | BODY MASS INDEX: 23.49 KG/M2 | WEIGHT: 155 LBS | HEIGHT: 68 IN | OXYGEN SATURATION: 93 %

## 2021-01-01 DIAGNOSIS — D69.49 PRIMARY THROMBOCYTOPENIA (HCC): ICD-10-CM

## 2021-01-01 DIAGNOSIS — I70.92 CHRONIC TOTAL OCCLUSION OF ARTERY OF EXTREMITY (HCC): ICD-10-CM

## 2021-01-01 DIAGNOSIS — R39.14 BENIGN PROSTATIC HYPERPLASIA WITH INCOMPLETE BLADDER EMPTYING: Primary | ICD-10-CM

## 2021-01-01 DIAGNOSIS — J96.11 CHRONIC RESPIRATORY FAILURE WITH HYPOXIA, ON HOME O2 THERAPY (HCC): ICD-10-CM

## 2021-01-01 DIAGNOSIS — F32.1 CURRENT MODERATE EPISODE OF MAJOR DEPRESSIVE DISORDER WITHOUT PRIOR EPISODE (HCC): ICD-10-CM

## 2021-01-01 DIAGNOSIS — I50.32 CHRONIC DIASTOLIC HEART FAILURE (HCC): ICD-10-CM

## 2021-01-01 DIAGNOSIS — J43.1 PANLOBULAR EMPHYSEMA (HCC): ICD-10-CM

## 2021-01-01 DIAGNOSIS — G20 PARKINSON'S DISEASE (HCC): ICD-10-CM

## 2021-01-01 DIAGNOSIS — I25.118 CORONARY ARTERY DISEASE OF NATIVE ARTERY OF NATIVE HEART WITH STABLE ANGINA PECTORIS (HCC): ICD-10-CM

## 2021-01-01 DIAGNOSIS — N40.1 BENIGN PROSTATIC HYPERPLASIA WITH INCOMPLETE BLADDER EMPTYING: Primary | ICD-10-CM

## 2021-01-01 DIAGNOSIS — Z99.81 CHRONIC RESPIRATORY FAILURE WITH HYPOXIA, ON HOME O2 THERAPY (HCC): ICD-10-CM

## 2021-01-01 PROCEDURE — 99214 OFFICE O/P EST MOD 30 MIN: CPT | Performed by: FAMILY MEDICINE

## 2021-01-01 RX ORDER — TAMSULOSIN HYDROCHLORIDE 0.4 MG/1
0.8 CAPSULE ORAL DAILY
COMMUNITY
Start: 2021-01-01

## 2021-01-04 PROBLEM — J43.1 PANLOBULAR EMPHYSEMA (HCC): Status: ACTIVE | Noted: 2021-01-01

## 2021-01-04 PROBLEM — I25.118 CORONARY ARTERY DISEASE OF NATIVE ARTERY OF NATIVE HEART WITH STABLE ANGINA PECTORIS (HCC): Status: ACTIVE | Noted: 2019-11-02

## 2021-01-04 NOTE — PROGRESS NOTES
[address]  PROGRESS NOTE  Chief Complaint:   Patient presents with: Follow - Up      HPI:   This is a 80year old male coming in for a follow-up visit.     He said that because of the difficulty with urination he will often sit on the t 53.0 %    .0 150.0 - 450.0 10(3)uL    MCV 80.2 80.0 - 100.0 fL    MCH 22.3 (L) 26.0 - 34.0 pg    MCHC 27.8 (L) 31.0 - 37.0 g/dL    RDW 19.4 (H) 11.0 - 15.0 %    RDW-SD 56.6 (H) 35.1 - 46.3 fL    Neutrophil Absolute Prelim 3.60 1.50 - 7.70 x10 (3) uL history.   Allergies:    Gabapentin              OTHER (SEE COMMENTS)    Comment:hypertension  Morphine                OTHER (SEE COMMENTS)    Comment:Other reaction(s): Swelling  Sulfa Antibiotics       HIVES  Codeine                     Comment:Other reac Counseling given: Not Answered       REVIEW OF SYSTEMS:   CONSTITUTIONAL:  Denies unusual weight gain/loss, fever, chills, or fatigue. EENT:  Eyes:  Denies eye pain, visual loss, blurred vision, double vision or yellow sclerae.  Ears, Nose, Throat:  Viviane Furbish patent, no nasal discharge Throat:  No tonsillar erythema or exudate. Mouth:  No oral lesions or ulcerations, good dentition. NECK: Supple, no CLAD, no JVD, no thyromegaly. SKIN: No rashes, no skin lesion, no bruising, good turgor.   HEART:  Regular rate Visit:  Requested Prescriptions      No prescriptions requested or ordered in this encounter       Health Maintenance:  Zoster Vaccines(1 of 2) due on 04/13/1980    Patient/Caregiver Education: Patient/Caregiver Education: There are no barriers to learning

## 2021-01-15 NOTE — TELEPHONE ENCOUNTER
Per [facility]-  Patient was found by his daughter in his home in the hallway this morning . Asking if  would sign Death Certificate. Informed-Yes.   Coy Wolfe, 01/15/21, 11:42 AM

## 2021-02-03 ENCOUNTER — TELEPHONE (OUTPATIENT)
Dept: FAMILY MEDICINE CLINIC | Facility: CLINIC | Age: 86
End: 2021-02-03

## 2023-09-23 NOTE — TELEPHONE ENCOUNTER
Agree with PT and OT for Parkinsons.
Orders Faxed.   Stephani Solorzano, 03/13/18, 9:49 AM
Please advise PT/OT Evaluation and Treatment.   Margie Myles, 03/13/18, 8:55 AM
Wants to recommend outpatient therapy for Parkinson's Disease therapy and occupational therapist for hand therapy.  Fax number [phone]
23-Sep-2023 20:10

## 2025-02-18 NOTE — TELEPHONE ENCOUNTER
Called patient and he said he bumped up his 02 yesterday from 2 L to 3 1/2 L because his 02 level was low. Patient states that his 02 level was low at 80-86%. Patient denied any SOB or any cough or other difficulty.      Patient was advised to go to [facility]

## 2025-07-08 NOTE — TELEPHONE ENCOUNTER
Patient states He was doing some heal/toe exercises. Felt something snap behind right knee. Now Swollen/Painful/Bruised. Appt given today 1pm with Dr Rachell Vu for evaluation.   Carrie Duverney, 03/21/18, 9:09 AM Quality [facility]: Preventive Care And Screening: Tobacco Use: Screening And Cessation Intervention: Patient screened for tobacco use and is an ex/non-smoker Detail Level: Detailed

## (undated) NOTE — LETTER
Date: 12/16/2019    Patient Name: Dashawn Costa          To Whom it may concern: This letter has been written at the patient's request. The above patient was seen at the [facility] for treatment of a medical condition.     I recommend as

## (undated) NOTE — MR AVS SNAPSHOT
[address]  [address] [id number]  [phone]               Thank you for choosing us for your health care visit with Sohail Ordonez MD.  We are glad to serve you and happy to provide you with this summary o Sulfamethoxazole W/Trimethoprim     Other reaction(s): Unknown  Other reaction(s): hives    Opioid Analgesics     Other reaction(s): Swelling    Penicillin G     Other reaction(s): swollen tongue    Trimethoprim Hives                Today's Vital Signs not sign up before the expiration date, you must request a new code. Your unique [employer] Access Code: [email or web address]  Expires: 7/15/2017  8:50 AM    If you have questions, you can call [phone] to talk to our [facility] Staff.  Remember, [employer]